# Patient Record
Sex: FEMALE | Race: BLACK OR AFRICAN AMERICAN | Employment: OTHER | URBAN - METROPOLITAN AREA
[De-identification: names, ages, dates, MRNs, and addresses within clinical notes are randomized per-mention and may not be internally consistent; named-entity substitution may affect disease eponyms.]

---

## 2017-01-11 ENCOUNTER — OFFICE VISIT (OUTPATIENT)
Dept: CARDIOLOGY CLINIC | Age: 77
End: 2017-01-11

## 2017-01-11 VITALS
RESPIRATION RATE: 18 BRPM | HEIGHT: 67 IN | SYSTOLIC BLOOD PRESSURE: 116 MMHG | HEART RATE: 72 BPM | DIASTOLIC BLOOD PRESSURE: 70 MMHG | BODY MASS INDEX: 25.71 KG/M2 | WEIGHT: 163.8 LBS

## 2017-01-11 DIAGNOSIS — I10 ESSENTIAL HYPERTENSION, BENIGN: Primary | ICD-10-CM

## 2017-01-11 DIAGNOSIS — I25.2 OLD MYOCARDIAL INFARCTION: ICD-10-CM

## 2017-01-11 DIAGNOSIS — I25.10 ATHEROSCLEROSIS OF NATIVE CORONARY ARTERY OF NATIVE HEART WITHOUT ANGINA PECTORIS: ICD-10-CM

## 2017-01-11 PROCEDURE — 99213 OFFICE O/P EST LOW 20 MIN: CPT | Performed by: INTERNAL MEDICINE

## 2017-02-28 PROBLEM — I10 HTN (HYPERTENSION): Status: ACTIVE | Noted: 2017-02-28

## 2017-02-28 PROBLEM — A41.9 SEPSIS (HCC): Status: ACTIVE | Noted: 2017-02-28

## 2017-02-28 PROBLEM — K21.9 GERD (GASTROESOPHAGEAL REFLUX DISEASE): Status: ACTIVE | Noted: 2017-02-28

## 2017-02-28 PROBLEM — I50.21 ACUTE SYSTOLIC (CONGESTIVE) HEART FAILURE (HCC): Status: ACTIVE | Noted: 2017-02-28

## 2017-02-28 PROBLEM — J18.9 CAP (COMMUNITY ACQUIRED PNEUMONIA): Status: ACTIVE | Noted: 2017-02-28

## 2017-03-13 ENCOUNTER — TELEPHONE (OUTPATIENT)
Dept: OTHER | Age: 77
End: 2017-03-13

## 2017-04-03 LAB
ALBUMIN SERPL-MCNC: 3.1 G/DL
ALP BLD-CCNC: 37 U/L
ALT SERPL-CCNC: 13 U/L
AST SERPL-CCNC: 23 U/L
BASOPHILS ABSOLUTE: 0 /ΜL
BASOPHILS RELATIVE PERCENT: 1 %
BILIRUB SERPL-MCNC: 0.3 MG/DL (ref 0.1–1.4)
BUN BLDV-MCNC: 15 MG/DL
C-REACTIVE PROTEIN: 23
CALCIUM SERPL-MCNC: 8.9 MG/DL
CHLORIDE BLD-SCNC: 101 MMOL/L
CO2: 28 MMOL/L
CREAT SERPL-MCNC: 0.68 MG/DL
EOSINOPHILS ABSOLUTE: 0.4 /ΜL
EOSINOPHILS RELATIVE PERCENT: 6 %
GFR CALCULATED: >60
GLUCOSE BLD-MCNC: 94 MG/DL
HCT VFR BLD CALC: 28 % (ref 36–46)
HEMOGLOBIN: 9.4 G/DL (ref 12–16)
LYMPHOCYTES ABSOLUTE: 1.5 /ΜL
LYMPHOCYTES RELATIVE PERCENT: 22 %
MCH RBC QN AUTO: 32 PG
MCHC RBC AUTO-ENTMCNC: 34 G/DL
MCV RBC AUTO: 96 FL
MONOCYTES ABSOLUTE: 1 /ΜL
MONOCYTES RELATIVE PERCENT: 15 %
NEUTROPHILS ABSOLUTE: 3.8 /ΜL
NEUTROPHILS RELATIVE PERCENT: 56 %
PDW BLD-RTO: 13.7 %
PLATELET # BLD: 257 K/ΜL
PMV BLD AUTO: ABNORMAL FL
POTASSIUM SERPL-SCNC: 3.2 MMOL/L
RBC # BLD: 2.9 10^6/ΜL
SEDIMENTATION RATE, ERYTHROCYTE: 55
SODIUM BLD-SCNC: 139 MMOL/L
TOTAL PROTEIN: 8.1
WBC # BLD: 6.7 10^3/ML

## 2017-04-06 ENCOUNTER — TELEPHONE (OUTPATIENT)
Dept: INFECTIOUS DISEASES | Age: 77
End: 2017-04-06

## 2017-04-07 ENCOUNTER — OFFICE VISIT (OUTPATIENT)
Dept: CARDIOLOGY CLINIC | Age: 77
End: 2017-04-07

## 2017-04-07 VITALS
WEIGHT: 162.4 LBS | DIASTOLIC BLOOD PRESSURE: 64 MMHG | HEART RATE: 112 BPM | BODY MASS INDEX: 25.44 KG/M2 | SYSTOLIC BLOOD PRESSURE: 136 MMHG

## 2017-04-07 DIAGNOSIS — I25.10 CORONARY ARTERY DISEASE INVOLVING NATIVE HEART WITHOUT ANGINA PECTORIS, UNSPECIFIED VESSEL OR LESION TYPE: ICD-10-CM

## 2017-04-07 DIAGNOSIS — I10 ESSENTIAL HYPERTENSION: ICD-10-CM

## 2017-04-07 DIAGNOSIS — Z90.81 HISTORY OF SPLENECTOMY: ICD-10-CM

## 2017-04-07 DIAGNOSIS — B95.3 BACTEREMIA DUE TO STREPTOCOCCUS PNEUMONIAE: Primary | ICD-10-CM

## 2017-04-07 DIAGNOSIS — E78.2 MIXED HYPERLIPIDEMIA: ICD-10-CM

## 2017-04-07 DIAGNOSIS — R78.81 BACTEREMIA DUE TO STREPTOCOCCUS PNEUMONIAE: Primary | ICD-10-CM

## 2017-04-07 PROCEDURE — 99214 OFFICE O/P EST MOD 30 MIN: CPT | Performed by: INTERNAL MEDICINE

## 2017-04-18 ENCOUNTER — OFFICE VISIT (OUTPATIENT)
Dept: INFECTIOUS DISEASES | Age: 77
End: 2017-04-18

## 2017-04-18 VITALS
HEIGHT: 67 IN | DIASTOLIC BLOOD PRESSURE: 73 MMHG | HEART RATE: 81 BPM | SYSTOLIC BLOOD PRESSURE: 116 MMHG | TEMPERATURE: 97 F | WEIGHT: 160 LBS | BODY MASS INDEX: 25.11 KG/M2

## 2017-04-18 DIAGNOSIS — R78.81 BACTEREMIA DUE TO STREPTOCOCCUS PNEUMONIAE: Primary | ICD-10-CM

## 2017-04-18 DIAGNOSIS — M46.46 LUMBAR DISCITIS: ICD-10-CM

## 2017-04-18 DIAGNOSIS — B95.3 BACTEREMIA DUE TO STREPTOCOCCUS PNEUMONIAE: Primary | ICD-10-CM

## 2017-04-18 DIAGNOSIS — D84.9 IMMUNOCOMPROMISED (HCC): ICD-10-CM

## 2017-04-18 PROCEDURE — 99215 OFFICE O/P EST HI 40 MIN: CPT | Performed by: INTERNAL MEDICINE

## 2017-04-18 RX ORDER — CEFADROXIL 500 MG/1
500 CAPSULE ORAL 2 TIMES DAILY
Qty: 60 CAPSULE | Refills: 0 | Status: SHIPPED | OUTPATIENT
Start: 2017-04-18 | End: 2017-05-23 | Stop reason: SDUPTHER

## 2017-04-18 RX ORDER — APIXABAN 5 MG/1
5 TABLET, FILM COATED ORAL 2 TIMES DAILY
COMMUNITY
Start: 2017-04-07 | End: 2017-08-22

## 2017-04-21 RX ORDER — SPIRONOLACTONE 25 MG/1
25 TABLET ORAL DAILY
Qty: 90 TABLET | Refills: 3 | Status: SHIPPED | OUTPATIENT
Start: 2017-04-21 | End: 2018-03-05 | Stop reason: SDUPTHER

## 2017-04-21 RX ORDER — CARVEDILOL 3.12 MG/1
3.12 TABLET ORAL 2 TIMES DAILY WITH MEALS
Qty: 180 TABLET | Refills: 3 | Status: SHIPPED | OUTPATIENT
Start: 2017-04-21 | End: 2018-03-05 | Stop reason: SDUPTHER

## 2017-04-21 RX ORDER — LISINOPRIL 2.5 MG/1
2.5 TABLET ORAL DAILY
Qty: 90 TABLET | Refills: 3 | Status: SHIPPED | OUTPATIENT
Start: 2017-04-21 | End: 2018-03-05 | Stop reason: SDUPTHER

## 2017-04-21 RX ORDER — DIGOXIN 125 MCG
125 TABLET ORAL DAILY
Qty: 90 TABLET | Refills: 3 | Status: SHIPPED | OUTPATIENT
Start: 2017-04-21 | End: 2018-03-05 | Stop reason: SDUPTHER

## 2017-05-23 ENCOUNTER — HOSPITAL ENCOUNTER (OUTPATIENT)
Dept: OTHER | Age: 77
Discharge: HOME | End: 2017-05-23
Attending: INTERNAL MEDICINE | Admitting: INTERNAL MEDICINE

## 2017-05-23 ENCOUNTER — OFFICE VISIT (OUTPATIENT)
Dept: INFECTIOUS DISEASES | Age: 77
End: 2017-05-23

## 2017-05-23 VITALS
TEMPERATURE: 97.7 F | WEIGHT: 157 LBS | HEART RATE: 81 BPM | DIASTOLIC BLOOD PRESSURE: 67 MMHG | SYSTOLIC BLOOD PRESSURE: 108 MMHG | HEIGHT: 67 IN | BODY MASS INDEX: 24.64 KG/M2

## 2017-05-23 DIAGNOSIS — M46.46 LUMBAR DISCITIS: ICD-10-CM

## 2017-05-23 DIAGNOSIS — R78.81 BACTEREMIA DUE TO STREPTOCOCCUS PNEUMONIAE: Primary | ICD-10-CM

## 2017-05-23 DIAGNOSIS — B95.3 BACTEREMIA DUE TO STREPTOCOCCUS PNEUMONIAE: Primary | ICD-10-CM

## 2017-05-23 LAB
A/G RATIO: 0.8 (ref 1.1–2.2)
ALBUMIN SERPL-MCNC: 3.5 G/DL (ref 3.4–5)
ALP BLD-CCNC: 42 U/L (ref 40–129)
ALT SERPL-CCNC: 11 U/L (ref 10–40)
ANION GAP SERPL CALCULATED.3IONS-SCNC: 11 MMOL/L (ref 3–16)
AST SERPL-CCNC: 21 U/L (ref 15–37)
BASOPHILS ABSOLUTE: 0.1 K/UL (ref 0–0.2)
BASOPHILS RELATIVE PERCENT: 1 %
BILIRUB SERPL-MCNC: 0.4 MG/DL (ref 0–1)
BUN BLDV-MCNC: 12 MG/DL (ref 7–20)
C-REACTIVE PROTEIN: 3.2 MG/L (ref 0–5.1)
CALCIUM SERPL-MCNC: 9.5 MG/DL (ref 8.3–10.6)
CHLORIDE BLD-SCNC: 105 MMOL/L (ref 99–110)
CO2: 28 MMOL/L (ref 21–32)
CREAT SERPL-MCNC: 0.6 MG/DL (ref 0.6–1.2)
EOSINOPHILS ABSOLUTE: 0.4 K/UL (ref 0–0.6)
EOSINOPHILS RELATIVE PERCENT: 7.8 %
GFR AFRICAN AMERICAN: >60
GFR NON-AFRICAN AMERICAN: >60
GLOBULIN: 4.3 G/DL
GLUCOSE BLD-MCNC: 101 MG/DL (ref 70–99)
HCT VFR BLD CALC: 38.5 % (ref 36–48)
HEMOGLOBIN: 12.3 G/DL (ref 12–16)
LYMPHOCYTES ABSOLUTE: 1.2 K/UL (ref 1–5.1)
LYMPHOCYTES RELATIVE PERCENT: 22.8 %
MCH RBC QN AUTO: 31.2 PG (ref 26–34)
MCHC RBC AUTO-ENTMCNC: 32 G/DL (ref 31–36)
MCV RBC AUTO: 97.5 FL (ref 80–100)
MONOCYTES ABSOLUTE: 0.7 K/UL (ref 0–1.3)
MONOCYTES RELATIVE PERCENT: 12.5 %
NEUTROPHILS ABSOLUTE: 3 K/UL (ref 1.7–7.7)
NEUTROPHILS RELATIVE PERCENT: 55.9 %
PDW BLD-RTO: 14.1 % (ref 12.4–15.4)
PLATELET # BLD: 230 K/UL (ref 135–450)
PMV BLD AUTO: 8.9 FL (ref 5–10.5)
POTASSIUM SERPL-SCNC: 4.6 MMOL/L (ref 3.5–5.1)
RBC # BLD: 3.95 M/UL (ref 4–5.2)
SEDIMENTATION RATE, ERYTHROCYTE: 25 MM/HR (ref 0–30)
SODIUM BLD-SCNC: 144 MMOL/L (ref 136–145)
TOTAL PROTEIN: 7.8 G/DL (ref 6.4–8.2)
WBC # BLD: 5.4 K/UL (ref 4–11)

## 2017-05-23 PROCEDURE — 99214 OFFICE O/P EST MOD 30 MIN: CPT | Performed by: INTERNAL MEDICINE

## 2017-05-23 RX ORDER — CEFADROXIL 500 MG/1
500 CAPSULE ORAL 2 TIMES DAILY
Qty: 60 CAPSULE | Refills: 1 | Status: SHIPPED | OUTPATIENT
Start: 2017-05-23 | End: 2017-06-07

## 2017-05-24 ENCOUNTER — TELEPHONE (OUTPATIENT)
Dept: INFECTIOUS DISEASES | Age: 77
End: 2017-05-24

## 2017-06-07 ENCOUNTER — OFFICE VISIT (OUTPATIENT)
Dept: CARDIOLOGY CLINIC | Age: 77
End: 2017-06-07

## 2017-06-07 VITALS
DIASTOLIC BLOOD PRESSURE: 60 MMHG | HEART RATE: 68 BPM | SYSTOLIC BLOOD PRESSURE: 100 MMHG | BODY MASS INDEX: 24.01 KG/M2 | WEIGHT: 153 LBS

## 2017-06-07 DIAGNOSIS — I10 ESSENTIAL HYPERTENSION: Primary | ICD-10-CM

## 2017-06-07 PROCEDURE — 93000 ELECTROCARDIOGRAM COMPLETE: CPT | Performed by: INTERNAL MEDICINE

## 2017-06-07 PROCEDURE — 99214 OFFICE O/P EST MOD 30 MIN: CPT | Performed by: INTERNAL MEDICINE

## 2017-06-15 ENCOUNTER — HOSPITAL ENCOUNTER (OUTPATIENT)
Dept: NON INVASIVE DIAGNOSTICS | Age: 77
Discharge: HOME | End: 2017-06-15
Attending: INTERNAL MEDICINE | Admitting: INTERNAL MEDICINE

## 2017-06-15 DIAGNOSIS — Z86.19 PERSONAL HISTORY OF OTHER INFECTIOUS AND PARASITIC DISEASES: ICD-10-CM

## 2017-06-15 LAB
LV EF: 48 %
LVEF MODALITY: NORMAL

## 2017-06-26 ENCOUNTER — HOSPITAL ENCOUNTER (OUTPATIENT)
Dept: MAMMOGRAPHY | Age: 77
Discharge: HOME | End: 2017-06-26
Attending: INTERNAL MEDICINE | Admitting: INTERNAL MEDICINE

## 2017-06-26 DIAGNOSIS — Z12.31 VISIT FOR SCREENING MAMMOGRAM: ICD-10-CM

## 2017-08-21 RX ORDER — FAMOTIDINE 20 MG/1
TABLET, FILM COATED ORAL
Qty: 180 TABLET | Refills: 2 | Status: SHIPPED | OUTPATIENT
Start: 2017-08-21 | End: 2018-05-20 | Stop reason: SDUPTHER

## 2017-09-06 ENCOUNTER — OFFICE VISIT (OUTPATIENT)
Dept: CARDIOLOGY CLINIC | Age: 77
End: 2017-09-06

## 2017-09-06 VITALS
WEIGHT: 152 LBS | SYSTOLIC BLOOD PRESSURE: 104 MMHG | BODY MASS INDEX: 23.86 KG/M2 | HEART RATE: 76 BPM | DIASTOLIC BLOOD PRESSURE: 50 MMHG

## 2017-09-06 DIAGNOSIS — E78.2 MIXED HYPERLIPIDEMIA: ICD-10-CM

## 2017-09-06 DIAGNOSIS — I50.21 ACUTE SYSTOLIC (CONGESTIVE) HEART FAILURE (HCC): ICD-10-CM

## 2017-09-06 DIAGNOSIS — I10 ESSENTIAL HYPERTENSION: Primary | ICD-10-CM

## 2017-09-06 DIAGNOSIS — I25.2 OLD MYOCARDIAL INFARCTION: ICD-10-CM

## 2017-09-06 PROCEDURE — 99214 OFFICE O/P EST MOD 30 MIN: CPT | Performed by: INTERNAL MEDICINE

## 2017-11-01 ENCOUNTER — HOSPITAL ENCOUNTER (OUTPATIENT)
Dept: OTHER | Age: 77
Discharge: HOME | End: 2017-11-30
Attending: INTERNAL MEDICINE | Admitting: INTERNAL MEDICINE

## 2017-11-08 RX ORDER — SIMVASTATIN 40 MG
TABLET ORAL
Qty: 90 TABLET | Refills: 3 | Status: SHIPPED | OUTPATIENT
Start: 2017-11-08 | End: 2018-11-26 | Stop reason: SDUPTHER

## 2017-12-01 ENCOUNTER — HOSPITAL ENCOUNTER (OUTPATIENT)
Dept: OTHER | Age: 77
Discharge: HOME | End: 2017-12-31
Attending: INTERNAL MEDICINE | Admitting: INTERNAL MEDICINE

## 2018-01-01 ENCOUNTER — HOSPITAL ENCOUNTER (OUTPATIENT)
Dept: OTHER | Age: 78
Discharge: HOME | End: 2018-01-31
Attending: INTERNAL MEDICINE | Admitting: INTERNAL MEDICINE

## 2018-01-30 ENCOUNTER — TELEPHONE (OUTPATIENT)
Dept: CARDIOLOGY CLINIC | Age: 78
End: 2018-01-30

## 2018-02-01 ENCOUNTER — HOSPITAL ENCOUNTER (OUTPATIENT)
Dept: OTHER | Age: 78
Discharge: HOME | End: 2018-02-28
Attending: INTERNAL MEDICINE | Admitting: INTERNAL MEDICINE

## 2018-03-01 ENCOUNTER — HOSPITAL ENCOUNTER (OUTPATIENT)
Dept: OTHER | Age: 78
Discharge: HOME | End: 2018-03-31
Attending: INTERNAL MEDICINE | Admitting: INTERNAL MEDICINE

## 2018-03-06 RX ORDER — DIGOXIN 125 MCG
TABLET ORAL
Qty: 90 TABLET | Refills: 3 | Status: SHIPPED | OUTPATIENT
Start: 2018-03-06 | End: 2019-01-10 | Stop reason: SDUPTHER

## 2018-03-06 RX ORDER — CARVEDILOL 3.12 MG/1
TABLET ORAL
Qty: 180 TABLET | Refills: 3 | Status: SHIPPED | OUTPATIENT
Start: 2018-03-06 | End: 2019-01-10 | Stop reason: SDUPTHER

## 2018-03-06 RX ORDER — SPIRONOLACTONE 25 MG/1
TABLET ORAL
Qty: 90 TABLET | Refills: 3 | Status: SHIPPED | OUTPATIENT
Start: 2018-03-06 | End: 2019-01-10 | Stop reason: SDUPTHER

## 2018-03-06 RX ORDER — LISINOPRIL 2.5 MG/1
TABLET ORAL
Qty: 90 TABLET | Refills: 3 | Status: SHIPPED | OUTPATIENT
Start: 2018-03-06 | End: 2019-01-10 | Stop reason: SDUPTHER

## 2018-04-01 ENCOUNTER — HOSPITAL ENCOUNTER (OUTPATIENT)
Dept: OTHER | Age: 78
Discharge: HOME | End: 2018-04-30
Attending: INTERNAL MEDICINE | Admitting: INTERNAL MEDICINE

## 2018-04-06 ENCOUNTER — OFFICE VISIT (OUTPATIENT)
Dept: CARDIOLOGY CLINIC | Age: 78
End: 2018-04-06

## 2018-04-06 VITALS
SYSTOLIC BLOOD PRESSURE: 120 MMHG | HEART RATE: 64 BPM | WEIGHT: 158.8 LBS | BODY MASS INDEX: 24.92 KG/M2 | DIASTOLIC BLOOD PRESSURE: 60 MMHG

## 2018-04-06 DIAGNOSIS — D69.3 IMMUNE THROMBOCYTOPENIC PURPURA (HCC): ICD-10-CM

## 2018-04-06 DIAGNOSIS — I25.10 ATHEROSCLEROSIS OF NATIVE CORONARY ARTERY OF NATIVE HEART WITHOUT ANGINA PECTORIS: Primary | ICD-10-CM

## 2018-04-06 DIAGNOSIS — I10 ESSENTIAL HYPERTENSION, BENIGN: ICD-10-CM

## 2018-04-06 PROCEDURE — G8598 ASA/ANTIPLAT THER USED: HCPCS | Performed by: INTERNAL MEDICINE

## 2018-04-06 PROCEDURE — G8399 PT W/DXA RESULTS DOCUMENT: HCPCS | Performed by: INTERNAL MEDICINE

## 2018-04-06 PROCEDURE — 1123F ACP DISCUSS/DSCN MKR DOCD: CPT | Performed by: INTERNAL MEDICINE

## 2018-04-06 PROCEDURE — G8427 DOCREV CUR MEDS BY ELIG CLIN: HCPCS | Performed by: INTERNAL MEDICINE

## 2018-04-06 PROCEDURE — 1036F TOBACCO NON-USER: CPT | Performed by: INTERNAL MEDICINE

## 2018-04-06 PROCEDURE — 1090F PRES/ABSN URINE INCON ASSESS: CPT | Performed by: INTERNAL MEDICINE

## 2018-04-06 PROCEDURE — 99213 OFFICE O/P EST LOW 20 MIN: CPT | Performed by: INTERNAL MEDICINE

## 2018-04-06 PROCEDURE — 4040F PNEUMOC VAC/ADMIN/RCVD: CPT | Performed by: INTERNAL MEDICINE

## 2018-04-06 PROCEDURE — G8420 CALC BMI NORM PARAMETERS: HCPCS | Performed by: INTERNAL MEDICINE

## 2018-04-06 RX ORDER — OMEPRAZOLE 20 MG/1
20 CAPSULE, DELAYED RELEASE ORAL DAILY
COMMUNITY
End: 2018-05-21 | Stop reason: SDUPTHER

## 2018-04-06 RX ORDER — DULOXETIN HYDROCHLORIDE 20 MG/1
20 CAPSULE, DELAYED RELEASE ORAL DAILY
COMMUNITY

## 2018-04-06 RX ORDER — GABAPENTIN 300 MG/1
300 CAPSULE ORAL 2 TIMES DAILY
COMMUNITY

## 2018-05-01 ENCOUNTER — HOSPITAL ENCOUNTER (OUTPATIENT)
Dept: OTHER | Age: 78
Discharge: HOME | End: 2018-05-31
Attending: INTERNAL MEDICINE | Admitting: INTERNAL MEDICINE

## 2018-05-21 RX ORDER — FAMOTIDINE 20 MG/1
TABLET, FILM COATED ORAL
Qty: 180 TABLET | Refills: 3 | Status: SHIPPED | OUTPATIENT
Start: 2018-05-21 | End: 2019-03-02 | Stop reason: SDUPTHER

## 2018-06-01 ENCOUNTER — HOSPITAL ENCOUNTER (OUTPATIENT)
Dept: OTHER | Age: 78
Discharge: HOME | End: 2018-06-30
Attending: INTERNAL MEDICINE | Admitting: INTERNAL MEDICINE

## 2018-07-01 ENCOUNTER — HOSPITAL ENCOUNTER (OUTPATIENT)
Dept: OTHER | Age: 78
Discharge: HOME | End: 2018-07-01
Attending: INTERNAL MEDICINE | Admitting: INTERNAL MEDICINE

## 2018-07-03 ENCOUNTER — HOSPITAL ENCOUNTER (OUTPATIENT)
Dept: MAMMOGRAPHY | Age: 78
Discharge: HOME | End: 2018-07-03
Attending: INTERNAL MEDICINE | Admitting: INTERNAL MEDICINE

## 2018-07-03 DIAGNOSIS — Z12.31 VISIT FOR SCREENING MAMMOGRAM: ICD-10-CM

## 2018-07-06 ENCOUNTER — HOSPITAL ENCOUNTER (OUTPATIENT)
Dept: MAMMOGRAPHY | Age: 78
Discharge: HOME | End: 2018-07-06
Attending: INTERNAL MEDICINE | Admitting: INTERNAL MEDICINE

## 2018-07-06 DIAGNOSIS — R92.8 ABNORMAL MAMMOGRAM: ICD-10-CM

## 2018-10-10 ENCOUNTER — OFFICE VISIT (OUTPATIENT)
Dept: CARDIOLOGY CLINIC | Age: 78
End: 2018-10-10
Payer: MEDICARE

## 2018-10-10 VITALS
WEIGHT: 153.8 LBS | SYSTOLIC BLOOD PRESSURE: 110 MMHG | HEART RATE: 59 BPM | DIASTOLIC BLOOD PRESSURE: 64 MMHG | BODY MASS INDEX: 24.14 KG/M2

## 2018-10-10 DIAGNOSIS — I10 ESSENTIAL HYPERTENSION: Primary | ICD-10-CM

## 2018-10-10 DIAGNOSIS — I25.10 CORONARY ARTERY DISEASE INVOLVING NATIVE HEART WITHOUT ANGINA PECTORIS, UNSPECIFIED VESSEL OR LESION TYPE: ICD-10-CM

## 2018-10-10 PROCEDURE — 1090F PRES/ABSN URINE INCON ASSESS: CPT | Performed by: INTERNAL MEDICINE

## 2018-10-10 PROCEDURE — 1101F PT FALLS ASSESS-DOCD LE1/YR: CPT | Performed by: INTERNAL MEDICINE

## 2018-10-10 PROCEDURE — G8598 ASA/ANTIPLAT THER USED: HCPCS | Performed by: INTERNAL MEDICINE

## 2018-10-10 PROCEDURE — G8399 PT W/DXA RESULTS DOCUMENT: HCPCS | Performed by: INTERNAL MEDICINE

## 2018-10-10 PROCEDURE — 4040F PNEUMOC VAC/ADMIN/RCVD: CPT | Performed by: INTERNAL MEDICINE

## 2018-10-10 PROCEDURE — G8484 FLU IMMUNIZE NO ADMIN: HCPCS | Performed by: INTERNAL MEDICINE

## 2018-10-10 PROCEDURE — 99214 OFFICE O/P EST MOD 30 MIN: CPT | Performed by: INTERNAL MEDICINE

## 2018-10-10 PROCEDURE — G8420 CALC BMI NORM PARAMETERS: HCPCS | Performed by: INTERNAL MEDICINE

## 2018-10-10 PROCEDURE — 1123F ACP DISCUSS/DSCN MKR DOCD: CPT | Performed by: INTERNAL MEDICINE

## 2018-10-10 PROCEDURE — 1036F TOBACCO NON-USER: CPT | Performed by: INTERNAL MEDICINE

## 2018-10-10 PROCEDURE — 93000 ELECTROCARDIOGRAM COMPLETE: CPT | Performed by: INTERNAL MEDICINE

## 2018-10-10 PROCEDURE — G8427 DOCREV CUR MEDS BY ELIG CLIN: HCPCS | Performed by: INTERNAL MEDICINE

## 2018-11-27 RX ORDER — SIMVASTATIN 40 MG
TABLET ORAL
Qty: 90 TABLET | Refills: 3 | Status: SHIPPED | OUTPATIENT
Start: 2018-11-27 | End: 2019-09-21 | Stop reason: SDUPTHER

## 2019-01-10 RX ORDER — CARVEDILOL 3.12 MG/1
TABLET ORAL
Qty: 180 TABLET | Refills: 3 | Status: SHIPPED | OUTPATIENT
Start: 2019-01-10 | End: 2019-12-04 | Stop reason: SDUPTHER

## 2019-01-10 RX ORDER — SPIRONOLACTONE 25 MG/1
TABLET ORAL
Qty: 90 TABLET | Refills: 3 | Status: SHIPPED | OUTPATIENT
Start: 2019-01-10 | End: 2019-12-04 | Stop reason: SDUPTHER

## 2019-01-10 RX ORDER — DIGOXIN 125 MCG
TABLET ORAL
Qty: 90 TABLET | Refills: 3 | Status: SHIPPED | OUTPATIENT
Start: 2019-01-10 | End: 2020-01-20

## 2019-01-10 RX ORDER — LISINOPRIL 2.5 MG/1
TABLET ORAL
Qty: 90 TABLET | Refills: 3 | Status: SHIPPED | OUTPATIENT
Start: 2019-01-10 | End: 2019-12-04 | Stop reason: SDUPTHER

## 2019-03-07 RX ORDER — FAMOTIDINE 20 MG/1
TABLET, FILM COATED ORAL
Qty: 180 TABLET | Refills: 2 | Status: SHIPPED | OUTPATIENT
Start: 2019-03-07 | End: 2019-12-02 | Stop reason: SDUPTHER

## 2019-03-15 ENCOUNTER — TELEPHONE (OUTPATIENT)
Dept: CARDIOLOGY CLINIC | Age: 79
End: 2019-03-15

## 2019-04-17 ENCOUNTER — OFFICE VISIT (OUTPATIENT)
Dept: CARDIOLOGY CLINIC | Age: 79
End: 2019-04-17
Payer: MEDICARE

## 2019-04-17 VITALS
HEART RATE: 62 BPM | BODY MASS INDEX: 25.14 KG/M2 | DIASTOLIC BLOOD PRESSURE: 70 MMHG | SYSTOLIC BLOOD PRESSURE: 102 MMHG | WEIGHT: 160.2 LBS

## 2019-04-17 DIAGNOSIS — I25.10 CORONARY ARTERY DISEASE INVOLVING NATIVE CORONARY ARTERY OF NATIVE HEART WITHOUT ANGINA PECTORIS: ICD-10-CM

## 2019-04-17 DIAGNOSIS — I10 ESSENTIAL HYPERTENSION, BENIGN: ICD-10-CM

## 2019-04-17 DIAGNOSIS — E78.2 MIXED HYPERLIPIDEMIA: Primary | ICD-10-CM

## 2019-04-17 PROCEDURE — 99214 OFFICE O/P EST MOD 30 MIN: CPT | Performed by: INTERNAL MEDICINE

## 2019-04-17 PROCEDURE — G8399 PT W/DXA RESULTS DOCUMENT: HCPCS | Performed by: INTERNAL MEDICINE

## 2019-04-17 PROCEDURE — 1123F ACP DISCUSS/DSCN MKR DOCD: CPT | Performed by: INTERNAL MEDICINE

## 2019-04-17 PROCEDURE — 1090F PRES/ABSN URINE INCON ASSESS: CPT | Performed by: INTERNAL MEDICINE

## 2019-04-17 PROCEDURE — G8598 ASA/ANTIPLAT THER USED: HCPCS | Performed by: INTERNAL MEDICINE

## 2019-04-17 PROCEDURE — G8419 CALC BMI OUT NRM PARAM NOF/U: HCPCS | Performed by: INTERNAL MEDICINE

## 2019-04-17 PROCEDURE — 4040F PNEUMOC VAC/ADMIN/RCVD: CPT | Performed by: INTERNAL MEDICINE

## 2019-04-17 PROCEDURE — G8427 DOCREV CUR MEDS BY ELIG CLIN: HCPCS | Performed by: INTERNAL MEDICINE

## 2019-04-17 PROCEDURE — 1036F TOBACCO NON-USER: CPT | Performed by: INTERNAL MEDICINE

## 2019-04-17 NOTE — PROGRESS NOTES
Subjective:      Patient ID: Jessica Ngo is a 77 y.o. female. CC:  2 month followup Follow up CAD with stents remotely and recent PNA and sepsis. HPI:  Patien has pna and sepsis. Lactate was 7.4 in beginning in March 2017 and was in hospital in [place] and then [facility]. Off eliquis for LLE DVT  Finished IV ATB for spine infection and finished po ATB. She has sinus pressure. No chest pain at all. Echo LVEF 25% and MPI 48% with no ischemia but apical infarct from 2000 MI and stent in mid LAD then. No chest pain. LVEF improved on recent 6/17 echo and LVEF 45-50%. No chest pressure nor orthopnea. Left knee swollen but no knee pain nor back pain. Goes to [facility] for water aerobics 2 times a week. No Known Allergies     Social History     Socioeconomic History    Marital status:      Spouse name: Not on file    Number of children: Not on file    Years of education: Not on file    Highest education level: Not on file   Occupational History    Not on file   Social Needs    Financial resource strain: Not on file    Food insecurity:     Worry: Not on file     Inability: Not on file    Transportation needs:     Medical: Not on file     Non-medical: Not on file   Tobacco Use    Smoking status: Never Smoker    Smokeless tobacco: Never Used   Substance and Sexual Activity    Alcohol use: No    Drug use: No    Sexual activity: Not on file     Comment: .    Lifestyle    Physical activity:     Days per week: Not on file     Minutes per session: Not on file    Stress: Not on file   Relationships    Social connections:     Talks on phone: Not on file     Gets together: Not on file     Attends Jehov[employer]'s witness service: Not on file     Active member of club or organization: Not on file     Attends meetings of clubs or organizations: Not on file     Relationship status: Not on file    Intimate partner violence:     Fear of current or ex partner: Not on file     Emotionally abused: Not on file     Physically abused: Not on file     Forced sexual activity: Not on file   Other Topics Concern    Not on file   Social History Narrative    Not on file        Patient has a family history includes Heart Disease in an other family member. Patient  has a past medical history of CAD (coronary artery disease), Cough, Hyperlipidemia, and Hypertension. Current Outpatient Medications   Medication Sig Dispense Refill    famotidine (PEPCID) 20 MG tablet TAKE 1 TABLET TWICE DAILY 180 tablet 2    lisinopril (PRINIVIL;ZESTRIL) 2.5 MG tablet TAKE 1 TABLET EVERY DAY 90 tablet 3    carvedilol (COREG) 3.125 MG tablet TAKE 1 TABLET TWICE DAILY WITH MEALS 180 tablet 3    spironolactone (ALDACTONE) 25 MG tablet TAKE 1 TABLET EVERY DAY 90 tablet 3    digoxin (LANOXIN) 125 MCG tablet TAKE 1 TABLET EVERY DAY 90 tablet 3    simvastatin (ZOCOR) 40 MG tablet TAKE 1 TABLET EVERY NIGHT 90 tablet 3    gabapentin (NEURONTIN) 300 MG capsule Take 300 mg by mouth 2 times daily.  DULoxetine (CYMBALTA) 20 MG extended release capsule Take 20 mg by mouth daily      lidocaine (LIDODERM) 5 % Place 1 patch onto the skin daily 12 hours on, 12 hours off. 30 patch 0    furosemide (LASIX) 20 MG tablet Take 1 tablet by mouth daily 60 tablet 0    aspirin 81 MG tablet Take 81 mg by mouth 2 times daily       nitroGLYCERIN (NITROSTAT) 0.4 MG SL tablet Place 1 tablet under the tongue every 5 minutes as needed 25 tablet 3    Handicap Placard MISC Duration 5 years  Dx: Coronoary Artery Disease, Old myocardial infarction 1 each 0     No current facility-administered medications for this visit. Vitals  Weight: 160 lb 3.2 oz (72.7 kg)164  Blood Pressure: BP: (102)/(70)  124/66  Pulse: 62 68      Review of Systems   Constitutional: Negative. HENT: Negative. Eyes: Negative. Respiratory: Negative. Cardiovascular: Negative. Gastrointestinal: Negative.         Objective:   Physical Exam   Nursing note and vitals

## 2019-06-24 ENCOUNTER — HOSPITAL ENCOUNTER (OUTPATIENT)
Dept: MAMMOGRAPHY | Age: 79
Discharge: HOME | End: 2019-06-24
Payer: MEDICARE

## 2019-06-24 DIAGNOSIS — Z12.31 VISIT FOR SCREENING MAMMOGRAM: ICD-10-CM

## 2019-06-24 PROCEDURE — 77067 SCR MAMMO BI INCL CAD: CPT

## 2019-07-03 ENCOUNTER — HOSPITAL ENCOUNTER (OUTPATIENT)
Dept: ULTRASOUND IMAGING | Age: 79
Discharge: HOME | End: 2019-07-03
Payer: MEDICARE

## 2019-07-03 ENCOUNTER — HOSPITAL ENCOUNTER (OUTPATIENT)
Dept: MAMMOGRAPHY | Age: 79
Discharge: HOME | End: 2019-07-03
Payer: MEDICARE

## 2019-07-03 DIAGNOSIS — N63.0 BREAST MASS: ICD-10-CM

## 2019-07-03 DIAGNOSIS — R92.8 ABNORMAL MAMMOGRAM: ICD-10-CM

## 2019-07-03 PROCEDURE — 88341 IMHCHEM/IMCYTCHM EA ADD ANTB: CPT

## 2019-07-03 PROCEDURE — 77065 DX MAMMO INCL CAD UNI: CPT

## 2019-07-03 PROCEDURE — 88360 TUMOR IMMUNOHISTOCHEM/MANUAL: CPT

## 2019-07-03 PROCEDURE — 2709999900 US BREAST BIOPSY W LOC DEVICE 1ST LESION RIGHT

## 2019-07-03 PROCEDURE — 76642 ULTRASOUND BREAST LIMITED: CPT

## 2019-07-03 PROCEDURE — 88342 IMHCHEM/IMCYTCHM 1ST ANTB: CPT

## 2019-07-03 PROCEDURE — 88305 TISSUE EXAM BY PATHOLOGIST: CPT

## 2019-07-03 PROCEDURE — 38505 NEEDLE BIOPSY LYMPH NODES: CPT

## 2019-07-26 ENCOUNTER — TELEPHONE (OUTPATIENT)
Dept: CARDIOLOGY CLINIC | Age: 79
End: 2019-07-26

## 2019-09-30 RX ORDER — SIMVASTATIN 40 MG
TABLET ORAL
Qty: 90 TABLET | Refills: 1 | Status: SHIPPED | OUTPATIENT
Start: 2019-09-30 | End: 2020-02-18

## 2019-10-14 ENCOUNTER — OFFICE VISIT (OUTPATIENT)
Dept: CARDIOLOGY CLINIC | Age: 79
End: 2019-10-14
Payer: MEDICARE

## 2019-10-14 VITALS
HEART RATE: 71 BPM | WEIGHT: 155.8 LBS | DIASTOLIC BLOOD PRESSURE: 70 MMHG | BODY MASS INDEX: 24.45 KG/M2 | SYSTOLIC BLOOD PRESSURE: 120 MMHG

## 2019-10-14 DIAGNOSIS — I10 ESSENTIAL HYPERTENSION, BENIGN: Primary | ICD-10-CM

## 2019-10-14 PROCEDURE — G8598 ASA/ANTIPLAT THER USED: HCPCS | Performed by: INTERNAL MEDICINE

## 2019-10-14 PROCEDURE — G8428 CUR MEDS NOT DOCUMENT: HCPCS | Performed by: INTERNAL MEDICINE

## 2019-10-14 PROCEDURE — 4040F PNEUMOC VAC/ADMIN/RCVD: CPT | Performed by: INTERNAL MEDICINE

## 2019-10-14 PROCEDURE — G8399 PT W/DXA RESULTS DOCUMENT: HCPCS | Performed by: INTERNAL MEDICINE

## 2019-10-14 PROCEDURE — 1123F ACP DISCUSS/DSCN MKR DOCD: CPT | Performed by: INTERNAL MEDICINE

## 2019-10-14 PROCEDURE — 93000 ELECTROCARDIOGRAM COMPLETE: CPT | Performed by: INTERNAL MEDICINE

## 2019-10-14 PROCEDURE — 1036F TOBACCO NON-USER: CPT | Performed by: INTERNAL MEDICINE

## 2019-10-14 PROCEDURE — G8484 FLU IMMUNIZE NO ADMIN: HCPCS | Performed by: INTERNAL MEDICINE

## 2019-10-14 PROCEDURE — G8420 CALC BMI NORM PARAMETERS: HCPCS | Performed by: INTERNAL MEDICINE

## 2019-10-14 PROCEDURE — 99214 OFFICE O/P EST MOD 30 MIN: CPT | Performed by: INTERNAL MEDICINE

## 2019-10-14 PROCEDURE — 1090F PRES/ABSN URINE INCON ASSESS: CPT | Performed by: INTERNAL MEDICINE

## 2019-12-03 RX ORDER — FAMOTIDINE 20 MG/1
TABLET, FILM COATED ORAL
Qty: 180 TABLET | Refills: 3 | Status: SHIPPED | OUTPATIENT
Start: 2019-12-03 | End: 2020-12-31

## 2019-12-04 RX ORDER — SPIRONOLACTONE 25 MG/1
TABLET ORAL
Qty: 90 TABLET | Refills: 0 | Status: SHIPPED | OUTPATIENT
Start: 2019-12-04 | End: 2020-05-04 | Stop reason: SDUPTHER

## 2019-12-04 RX ORDER — CARVEDILOL 3.12 MG/1
TABLET ORAL
Qty: 180 TABLET | Refills: 0 | Status: SHIPPED | OUTPATIENT
Start: 2019-12-04 | End: 2020-05-04 | Stop reason: SDUPTHER

## 2019-12-04 RX ORDER — LISINOPRIL 2.5 MG/1
TABLET ORAL
Qty: 90 TABLET | Refills: 0 | Status: SHIPPED | OUTPATIENT
Start: 2019-12-04 | End: 2020-05-04 | Stop reason: SDUPTHER

## 2020-01-20 ENCOUNTER — OFFICE VISIT (OUTPATIENT)
Dept: CARDIOLOGY CLINIC | Age: 80
End: 2020-01-20
Payer: MEDICARE

## 2020-01-20 VITALS
BODY MASS INDEX: 25.58 KG/M2 | SYSTOLIC BLOOD PRESSURE: 110 MMHG | DIASTOLIC BLOOD PRESSURE: 64 MMHG | WEIGHT: 163 LBS | HEART RATE: 72 BPM

## 2020-01-20 PROCEDURE — 1036F TOBACCO NON-USER: CPT | Performed by: INTERNAL MEDICINE

## 2020-01-20 PROCEDURE — G8399 PT W/DXA RESULTS DOCUMENT: HCPCS | Performed by: INTERNAL MEDICINE

## 2020-01-20 PROCEDURE — 4040F PNEUMOC VAC/ADMIN/RCVD: CPT | Performed by: INTERNAL MEDICINE

## 2020-01-20 PROCEDURE — G8427 DOCREV CUR MEDS BY ELIG CLIN: HCPCS | Performed by: INTERNAL MEDICINE

## 2020-01-20 PROCEDURE — 1090F PRES/ABSN URINE INCON ASSESS: CPT | Performed by: INTERNAL MEDICINE

## 2020-01-20 PROCEDURE — G8484 FLU IMMUNIZE NO ADMIN: HCPCS | Performed by: INTERNAL MEDICINE

## 2020-01-20 PROCEDURE — 1123F ACP DISCUSS/DSCN MKR DOCD: CPT | Performed by: INTERNAL MEDICINE

## 2020-01-20 PROCEDURE — 99213 OFFICE O/P EST LOW 20 MIN: CPT | Performed by: INTERNAL MEDICINE

## 2020-01-20 PROCEDURE — G8417 CALC BMI ABV UP PARAM F/U: HCPCS | Performed by: INTERNAL MEDICINE

## 2020-01-20 RX ORDER — FUROSEMIDE 20 MG/1
20 TABLET ORAL DAILY
Qty: 60 TABLET | Refills: 5 | Status: SHIPPED | OUTPATIENT
Start: 2020-01-20 | End: 2020-09-16 | Stop reason: SDUPTHER

## 2020-01-20 NOTE — PROGRESS NOTES
file     Physically abused: Not on file     Forced sexual activity: Not on file   Other Topics Concern    Not on file   Social History Narrative    Not on file        Patient has a family history includes Heart Disease in an other family member. Patient  has a past medical history of CAD (coronary artery disease), Cough, Hyperlipidemia, and Hypertension. Current Outpatient Medications   Medication Sig Dispense Refill    carvedilol (COREG) 3.125 MG tablet TAKE 1 TABLET TWICE DAILY  WITH  MEALS 180 tablet 0    spironolactone (ALDACTONE) 25 MG tablet TAKE 1 TABLET EVERY DAY 90 tablet 0    lisinopril (PRINIVIL;ZESTRIL) 2.5 MG tablet TAKE 1 TABLET EVERY DAY 90 tablet 0    famotidine (PEPCID) 20 MG tablet TAKE 1 TABLET TWICE DAILY 180 tablet 3    simvastatin (ZOCOR) 40 MG tablet TAKE 1 TABLET EVERY NIGHT 90 tablet 1    gabapentin (NEURONTIN) 300 MG capsule Take 300 mg by mouth 2 times daily.  DULoxetine (CYMBALTA) 20 MG extended release capsule Take 20 mg by mouth daily      lidocaine (LIDODERM) 5 % Place 1 patch onto the skin daily 12 hours on, 12 hours off. 30 patch 0    furosemide (LASIX) 20 MG tablet Take 1 tablet by mouth daily 60 tablet 0    aspirin 81 MG tablet Take 81 mg by mouth 2 times daily       nitroGLYCERIN (NITROSTAT) 0.4 MG SL tablet Place 1 tablet under the tongue every 5 minutes as needed 25 tablet 3    Handicap Placard MISC Duration 5 years  Dx: Coronoary Artery Disease, Old myocardial infarction 1 each 0     No current facility-administered medications for this visit. Vitals  Weight: 163 lb (73.9 kg)164  Blood Pressure: BP: (110)/(64)  124/66  Pulse: 72 68      Review of Systems   Constitutional: Negative. HENT: Negative. Eyes: Negative. Respiratory: Negative. Cardiovascular: Negative. Gastrointestinal: Negative. Objective:   Physical Exam   Nursing note and vitals reviewed. Constitutional: She is oriented to person, place, and time.  She appears well-developed and well-nourished. HENT:   Head: Normocephalic and atraumatic. Eyes: Conjunctivae are normal. Pupils are equal, round, and reactive to light. Neck: Normal range of motion. Neck supple. No JVD present. No tracheal deviation present. No thyromegaly present. Cardiovascular: Normal rate, regular rhythm, normal heart sounds and intact distal pulses. Exam reveals no gallop and no friction rub. No murmur heard. Pulmonary/Chest: Effort normal. No stridor. No respiratory distress. She has no wheezes. She has no rales. She exhibits no tenderness. Abdominal: Soft. Bowel sounds are normal. She exhibits no distension and no mass. No tenderness. She has no rebound and no guarding. Musculoskeletal: She exhibits no edema and no tenderness. Lymphadenopathy:     She has no cervical adenopathy. Neurological: She is alert and oriented to person, place, and time. No cranial nerve deficit. Coordination normal.   Skin: Skin is warm and dry. No rash noted. No erythema. No pallor. Psychiatric: She has a normal mood and affect. Her behavior is normal. Judgment and thought content normal.       Assessment:      Patient Active Problem List   Diagnosis    Sarcoidosis    Mixed hyperlipidemia    Immune thrombocytopenic purpura ([address]    Essential hypertension, benign    Old myocardial infarction    Coronary atherosclerosis of native coronary artery    Sprain of wrist    [facility]openia) [facility]    History of splenectomy    CAP (community acquired pneumonia)    Sepsis ([address]    HTN (hypertension)    GERD (gastroesophageal reflux disease)    Lactic acidosis    Streptococcal sepsis (HCC)    TIKI (acute kidney injury) ([address]    Acute systolic congestive heart failure (HCC)    Shock ([address]    Pneumonia due to Streptococcus pneumoniae ([address]    Bacteremia due to Streptococcus pneumoniae           Plan:      CAD:  Stable after remote stenting. Continue coreg and baby asa.  No SL NTG use. Mild ischemic CMP:  Stable. NO orthopnea nor left heart failure symptoms  HTN:  Controlled with lisinopril and coreg  HLP:  Controlled with simvastatin 40 daily  Edema: continue furosemide PRN. Recent echo improved to near normal with LVEF 35-40% on 9/19 echo  She had seven chemo runs for breast CAncer and had surgery, and completed radiation. Had left tkr March 12, 2018 at [facility] and doing well. No heart issues with surgery. Did fine from heart perspective with surgery.

## 2020-02-18 RX ORDER — SIMVASTATIN 40 MG
TABLET ORAL
Qty: 90 TABLET | Refills: 2 | Status: SHIPPED | OUTPATIENT
Start: 2020-02-18 | End: 2020-10-22

## 2020-05-05 RX ORDER — SPIRONOLACTONE 25 MG/1
TABLET ORAL
Qty: 90 TABLET | Refills: 3 | Status: SHIPPED | OUTPATIENT
Start: 2020-05-05 | End: 2021-02-03

## 2020-05-05 RX ORDER — LISINOPRIL 2.5 MG/1
TABLET ORAL
Qty: 90 TABLET | Refills: 3 | Status: SHIPPED | OUTPATIENT
Start: 2020-05-05 | End: 2021-02-03

## 2020-05-05 RX ORDER — CARVEDILOL 3.12 MG/1
TABLET ORAL
Qty: 180 TABLET | Refills: 3 | Status: SHIPPED | OUTPATIENT
Start: 2020-05-05 | End: 2021-02-03

## 2020-07-31 ENCOUNTER — OFFICE VISIT (OUTPATIENT)
Dept: CARDIOLOGY CLINIC | Age: 80
End: 2020-07-31
Payer: MEDICARE

## 2020-07-31 VITALS
HEART RATE: 76 BPM | SYSTOLIC BLOOD PRESSURE: 126 MMHG | DIASTOLIC BLOOD PRESSURE: 68 MMHG | TEMPERATURE: 97.3 F | WEIGHT: 157.8 LBS | BODY MASS INDEX: 24.77 KG/M2

## 2020-07-31 PROCEDURE — G8420 CALC BMI NORM PARAMETERS: HCPCS | Performed by: INTERNAL MEDICINE

## 2020-07-31 PROCEDURE — 4040F PNEUMOC VAC/ADMIN/RCVD: CPT | Performed by: INTERNAL MEDICINE

## 2020-07-31 PROCEDURE — G8399 PT W/DXA RESULTS DOCUMENT: HCPCS | Performed by: INTERNAL MEDICINE

## 2020-07-31 PROCEDURE — 1036F TOBACCO NON-USER: CPT | Performed by: INTERNAL MEDICINE

## 2020-07-31 PROCEDURE — 1090F PRES/ABSN URINE INCON ASSESS: CPT | Performed by: INTERNAL MEDICINE

## 2020-07-31 PROCEDURE — 99214 OFFICE O/P EST MOD 30 MIN: CPT | Performed by: INTERNAL MEDICINE

## 2020-07-31 PROCEDURE — 1123F ACP DISCUSS/DSCN MKR DOCD: CPT | Performed by: INTERNAL MEDICINE

## 2020-07-31 PROCEDURE — G8427 DOCREV CUR MEDS BY ELIG CLIN: HCPCS | Performed by: INTERNAL MEDICINE

## 2020-07-31 NOTE — PROGRESS NOTES
Subjective:      Patient ID: Saige Kramer is a 79 y.o. female. CC: cad      HPI:  Patien has pna and sepsis. Lactate was 7.4 in beginning in March 2017 and was in hospital in [place] and then [facility]. Off eliquis for LLE DVT  Finished IV ATB for spine infection and finished po ATB. She has sinus pressure. No chest pain at all. Echo LVEF 25% and MPI 48% with no ischemia but apical infarct from 2000 MI and stent in mid LAD then. No chest pain. LVEF improved on recent 6/17 echo and LVEF 45-50%. No chest pressure nor orthopnea. Left knee swollen but no knee pain nor back pain. Goes to [facility] for water aerobics 2 times a week. Wants a stair lift. No Known Allergies     Social History     Socioeconomic History    Marital status:      Spouse name: Not on file    Number of children: Not on file    Years of education: Not on file    Highest education level: Not on file   Occupational History    Not on file   Social Needs    Financial resource strain: Not on file    Food insecurity     Worry: Not on file     Inability: Not on file    Transportation needs     Medical: Not on file     Non-medical: Not on file   Tobacco Use    Smoking status: Never Smoker    Smokeless tobacco: Never Used   Substance and Sexual Activity    Alcohol use: No    Drug use: No    Sexual activity: Not on file     Comment: .    Lifestyle    Physical activity     Days per week: Not on file     Minutes per session: Not on file    Stress: Not on file   Relationships    Social connections     Talks on phone: Not on file     Gets together: Not on file     Attends Synagogue service: Not on file     Active member of club or organization: Not on file     Attends meetings of clubs or organizations: Not on file     Relationship status: Not on file    Intimate partner violence     Fear of current or ex partner: Not on file     Emotionally abused: Not on file     Physically abused: Not on file     Forced sexual activity: Not on file   Other Topics Concern    Not on file   Social History Narrative    Not on file        Patient has a family history includes Heart Disease in an other family member. Patient  has a past medical history of CAD (coronary artery disease), Cough, Hyperlipidemia, and Hypertension. Current Outpatient Medications   Medication Sig Dispense Refill    carvedilol (COREG) 3.125 MG tablet TAKE 1 TABLET TWICE DAILY  WITH  MEALS 180 tablet 3    lisinopril (PRINIVIL;ZESTRIL) 2.5 MG tablet TAKE 1 TABLET EVERY DAY 90 tablet 3    spironolactone (ALDACTONE) 25 MG tablet TAKE 1 TABLET EVERY DAY 90 tablet 3    simvastatin (ZOCOR) 40 MG tablet TAKE 1 TABLET EVERY NIGHT 90 tablet 2    furosemide (LASIX) 20 MG tablet Take 1 tablet by mouth daily 60 tablet 5    famotidine (PEPCID) 20 MG tablet TAKE 1 TABLET TWICE DAILY 180 tablet 3    gabapentin (NEURONTIN) 300 MG capsule Take 300 mg by mouth 2 times daily.  DULoxetine (CYMBALTA) 20 MG extended release capsule Take 20 mg by mouth daily      lidocaine (LIDODERM) 5 % Place 1 patch onto the skin daily 12 hours on, 12 hours off. 30 patch 0    aspirin 81 MG tablet Take 81 mg by mouth 2 times daily       nitroGLYCERIN (NITROSTAT) 0.4 MG SL tablet Place 1 tablet under the tongue every 5 minutes as needed 25 tablet 3    Handicap Placard MISC Duration 5 years  Dx: Coronoary Artery Disease, Old myocardial infarction 1 each 0     No current facility-administered medications for this visit. Vitals  Weight: 157 lb 12.8 oz (71.6 kg)164  Blood Pressure: BP: (126)/(68)  124/66  Pulse: 76 68      Review of Systems   Constitutional: Negative. HENT: Negative. Eyes: Negative. Respiratory: Negative. Cardiovascular: Negative. Gastrointestinal: Negative. Objective:   Physical Exam   Nursing note and vitals reviewed. Constitutional: She is oriented to person, place, and time. She appears well-developed and well-nourished.    HENT:   Head: Normocephalic and atraumatic. Eyes: Conjunctivae are normal. Pupils are equal, round, and reactive to light. Neck: Normal range of motion. Neck supple. No JVD present. No tracheal deviation present. No thyromegaly present. Cardiovascular: Normal rate, regular rhythm, normal heart sounds and intact distal pulses. Exam reveals no gallop and no friction rub. No murmur heard. Pulmonary/Chest: Effort normal. No stridor. No respiratory distress. She has no wheezes. She has no rales. She exhibits no tenderness. Abdominal: Soft. Bowel sounds are normal. She exhibits no distension and no mass. No tenderness. She has no rebound and no guarding. Musculoskeletal: She exhibits no edema and no tenderness. Lymphadenopathy:     She has no cervical adenopathy. Neurological: She is alert and oriented to person, place, and time. No cranial nerve deficit. Coordination normal.   Skin: Skin is warm and dry. No rash noted. No erythema. No pallor. Psychiatric: She has a normal mood and affect. Her behavior is normal. Judgment and thought content normal.       Assessment:      Patient Active Problem List   Diagnosis    Sarcoidosis    Mixed hyperlipidemia    Immune thrombocytopenic purpura ([address]    Essential hypertension, benign    Old myocardial infarction    Coronary atherosclerosis of native coronary artery    Sprain of wrist    Chronic ITP [facility]opathic thrombocytopenia) ([facility])    History of splenectomy    CAP (community acquired pneumonia)    Sepsis ([address]    HTN (hypertension)    GERD (gastroesophageal reflux disease)    Lactic acidosis    Streptococcal sepsis (HCC)    TIKI (acute kidney injury) ([address]    Acute systolic congestive heart failure (HCC)    Shock ([address]    Pneumonia due to Streptococcus pneumoniae ([address]    Bacteremia due to Streptococcus pneumoniae           Plan:      CAD:  Stable after remote stenting. Continue coreg and baby asa. No SL NTG use. Mild ischemic CMP:  Stable.  NO orthopnea nor left heart failure symptoms  HTN:  Controlled with lisinopril and coreg  HLP:  Controlled with simvastatin 40 daily  Edema: continue furosemide PRN. Go to lasix 10 qod with 20 qod. Breast cancer with lumpectomy chemo with neuropathy. Recent echo improved to near normal with LVEF 35-40% on 9/19 echo  She had seven chemo runs for breast CAncer and had surgery, and completed radiation. Had left tkr March 12, 2018 at [facility] and doing well. No heart issues with surgery. Did fine from heart perspective with surgery.

## 2020-09-15 ENCOUNTER — TELEPHONE (OUTPATIENT)
Dept: CARDIOLOGY CLINIC | Age: 80
End: 2020-09-15

## 2020-09-16 ENCOUNTER — OFFICE VISIT (OUTPATIENT)
Dept: ORTHOPEDIC SURGERY | Age: 80
End: 2020-09-16
Payer: MEDICARE

## 2020-09-16 VITALS — TEMPERATURE: 97.3 F | WEIGHT: 157 LBS | HEIGHT: 66 IN | BODY MASS INDEX: 25.23 KG/M2

## 2020-09-16 PROCEDURE — G8427 DOCREV CUR MEDS BY ELIG CLIN: HCPCS | Performed by: ORTHOPAEDIC SURGERY

## 2020-09-16 PROCEDURE — 20550 NJX 1 TENDON SHEATH/LIGAMENT: CPT | Performed by: ORTHOPAEDIC SURGERY

## 2020-09-16 PROCEDURE — 1090F PRES/ABSN URINE INCON ASSESS: CPT | Performed by: ORTHOPAEDIC SURGERY

## 2020-09-16 PROCEDURE — G8417 CALC BMI ABV UP PARAM F/U: HCPCS | Performed by: ORTHOPAEDIC SURGERY

## 2020-09-16 PROCEDURE — 99203 OFFICE O/P NEW LOW 30 MIN: CPT | Performed by: ORTHOPAEDIC SURGERY

## 2020-09-16 RX ORDER — FUROSEMIDE 20 MG/1
20 TABLET ORAL SEE ADMIN INSTRUCTIONS
Qty: 90 TABLET | Refills: 2 | Status: SHIPPED | OUTPATIENT
Start: 2020-09-16 | End: 2022-02-10 | Stop reason: SDUPTHER

## 2020-09-17 NOTE — PATIENT INSTRUCTIONS
Information & Instructions   After Finger, Hand, Wrist, or Elbow Injection    Bertin Deal MD    You have received an injection of local anesthetic (Bupivicaine without Epinephrine) for comfort & a steroid (Kenalog) for its strong anti-inflammatory effects. In order to give the medication a chance to reduce your inflammation and discomfort, it is recommended that you take it easy for a day or so. You may use your hand and arm as you feel comfortable, but you should avoid highly strenuous activity and heavy use for several days. Relief from the injection will often not begin for several days, and you may not feel full relief for up to one month. It is not uncommon to experience some local discomfort or pain at or around the injection site for a few days. To relieve these symptoms you may do the following if you feel necessary:       Apply ice to the affected area 20 minutes on and 20 minutes off. Do not apply ice directly to the skin. Use a thin layer (T-shirt, pillowcase, towel, etc.) to protect the skin. - If allowed by your other medical physicians, you may take -     2 Tylenol extra strength tablets every 4-6 hours       1-2 Aleve tablets twice a day     2-3 Advil tablets two to three times a day    If you are diabetic, the steroid medication may increase your blood sugar, so you are advised to monitor your sugar more closely so you can adjust it accordingly for a few days following your injection. If you need assistance with the control of you blood sugar, please contact you primary care physician for further advice. I will request that you please call the office one month after your injection at [phone] if you have not experienced relief of your symptoms (unless I have instructed you otherwise). If your injection has given you good relief of you symptoms as expected, then you only need to call the office if your symptoms return.

## 2020-09-17 NOTE — PROGRESS NOTES
symptomatic digit, absent elsewhere bilaterally  There is no evidence of gross joint instability bilaterally. Muscular strength is clinically appropriate bilaterally. Examination for Stenosing Tenosynovitis demonstrates moderate tenderness, thickening & nodularity at the A-1 pulley(s) of the Bilateral Thumb. There is a palpable Nota's Node. There is Active triggering on active flexion with pain. No other digits demonstrate evidence of Stenosing Tenosynovitis. Impression:  Ms. Candance Decree is showing clinical evidence of Stenosing Tenosynovitis (Trigger Finger) and presents requesting further treatment. Plan:    I have had a thorough discussion with Ms. Candance Decree regarding the treatment options available for her new onset Bilateral Thumb stenosing tenosynovitis, which is causing her functional limitations. I have outlined for Ms. Candance Decree the benefits and consequences of the various treatment modalities, including a reasonable expectation for the long term success and the likelihood that further more aggressive treatment may be required for her current presenting condition. Based upon our current discussion and a reasonable understating of the options available to her, Ms. Candance Decree has selected to proceed with  an injection to both Thumb Flexor Tendon Sheath. I have outlined for her the nature of the injection, and the pre, galo and post injection considerations and the appropriate expectations for this injection. I have clearly explained to her that the above outlined treatment plan should not be expected to 'cure' her stenosing tenosynovitis, but we are rather treating the symptoms with which she presents. She has understood that in order to achieve long lasting relief of her symptoms and to prevent future worsening or further damage, that definitive surgical treatment would be required.  Ms. Candance Decree  voiced an appropriate understanding of our discussion, the options available to her, and of the expectations of her selected  treatment. She did wish to proceed with Bilateral Thumb Flexor Tendon Sheath injection. Procedure:  right Thumb Trigger Finger Injection  [first Injection]: After full discussion of the nature of this process and outlining a treatment plan with Ms. Mai Tirado, we discussed the complications, limitations, expectations, alternatives, and risks of injection of the flexor tendon sheath. She understood this information well and verbally consented to this treatment. The skin of the symptomatic digit was prepped with Isopropyl Alcohol and under aseptic conditions the flexor tendon sheath was injected with a combination of 1/2 ml of 0.25% Bupivacaine without Epinephrine and 20 mg of Triamcinolone (40 mg/ml). Good filling of the flexor sheath was noted. A dry sterile bandage was applied and the patient tolerated the injection without difficulty. I advised the patient of the expected response, possible reactions and the instructions for care of the hand. \Procedure:  left Thumb Trigger Finger Injection  [first Injection]: After full discussion of the nature of this process and outlining a treatment plan with Ms. Mai Tirado, we discussed the complications, limitations, expectations, alternatives, and risks of injection of the flexor tendon sheath. She understood this information well and verbally consented to this treatment. The skin of the symptomatic digit was prepped with Isopropyl Alcohol and under aseptic conditions the flexor tendon sheath was injected with a combination of 1/2 ml of 0.25% Bupivacaine without Epinephrine and 20 mg of Triamcinolone (40 mg/ml). Good filling of the flexor sheath was noted. A dry sterile bandage was applied and the patient tolerated the injection without difficulty. I advised the patient of the expected response, possible reactions and the instructions for care of the hand.        I have also discussed with Ms. Mai Tirado the other treatment options available to her for this condition. We have today selected to proceed with treatment by injection with steroid medication. She and I have agreed that if our current course of Injection treatment does not prove to be effective over the short term future, that she will schedule a follow-up appointment to discuss and select an alternate course of therapy including possibly further conservative treatment or surgical treatment. Ms. Mai Tirado has been given a full verbal list of instructions and precautions related to her present condition. I have asked her to followup with me in the office at the prescribed time. She is also specifically requested to call or return to the office sooner if her symptoms change or worsen prior to the next scheduled appointment.

## 2020-10-22 RX ORDER — SIMVASTATIN 40 MG
TABLET ORAL
Qty: 90 TABLET | Refills: 2 | Status: SHIPPED | OUTPATIENT
Start: 2020-10-22 | End: 2021-11-26

## 2020-12-31 RX ORDER — FAMOTIDINE 20 MG/1
TABLET, FILM COATED ORAL
Qty: 180 TABLET | Refills: 3 | Status: SHIPPED | OUTPATIENT
Start: 2020-12-31 | End: 2021-10-25

## 2020-12-31 NOTE — TELEPHONE ENCOUNTER
Requested Prescriptions     Pending Prescriptions Disp Refills    famotidine (PEPCID) 20 MG tablet [Pharmacy Med Name: FAMOTIDINE 20 MG Tablet] 180 tablet 3     Sig: TAKE 1 TABLET TWICE DAILY          Number: 180    Refills: 3    Last Office Visit: 7/31/2020     Next Office Visit: 2/12/2021     Last Refill: 12/03/2019    Last Labs:04/17/2019 Digoxin/ Lipid/ CMP/ CBC

## 2021-01-13 ENCOUNTER — TELEPHONE (OUTPATIENT)
Dept: CARDIOLOGY CLINIC | Age: 81
End: 2021-01-13

## 2021-01-13 DIAGNOSIS — I50.21 ACUTE SYSTOLIC CONGESTIVE HEART FAILURE (HCC): Primary | ICD-10-CM

## 2021-01-13 NOTE — TELEPHONE ENCOUNTER
Pt calling and request a RX for her Handicap placard.  Please call [phone] when ready or if you have questions

## 2021-02-03 RX ORDER — SPIRONOLACTONE 25 MG/1
TABLET ORAL
Qty: 90 TABLET | Refills: 3 | Status: SHIPPED | OUTPATIENT
Start: 2021-02-03 | End: 2021-11-11

## 2021-02-03 RX ORDER — CARVEDILOL 3.12 MG/1
TABLET ORAL
Qty: 180 TABLET | Refills: 3 | Status: SHIPPED | OUTPATIENT
Start: 2021-02-03 | End: 2021-11-11

## 2021-02-03 RX ORDER — LISINOPRIL 2.5 MG/1
TABLET ORAL
Qty: 90 TABLET | Refills: 3 | Status: SHIPPED | OUTPATIENT
Start: 2021-02-03 | End: 2021-11-11

## 2021-02-03 NOTE — TELEPHONE ENCOUNTER
Requested Prescriptions     Pending Prescriptions Disp Refills    carvedilol (COREG) 3.125 MG tablet [Pharmacy Med Name: CARVEDILOL 3.125 MG Tablet] 180 tablet 3     Sig: TAKE 1 TABLET TWICE DAILY  WITH  MEALS    spironolactone (ALDACTONE) 25 MG tablet [Pharmacy Med Name: SPIRONOLACTONE 25 MG Tablet] 90 tablet 3     Sig: TAKE 1 TABLET EVERY DAY    lisinopril (PRINIVIL;ZESTRIL) 2.5 MG tablet [Pharmacy Med Name: LISINOPRIL 2.5 MG Tablet] 90 tablet 3     Sig: TAKE 1 TABLET EVERY DAY              Last Office Visit: 7/31/2020     Next Office Visit: 2/12/2021

## 2021-02-12 ENCOUNTER — OFFICE VISIT (OUTPATIENT)
Dept: CARDIOLOGY CLINIC | Age: 81
End: 2021-02-12
Payer: MEDICARE

## 2021-02-12 VITALS
WEIGHT: 168.6 LBS | TEMPERATURE: 97.3 F | SYSTOLIC BLOOD PRESSURE: 130 MMHG | BODY MASS INDEX: 27.21 KG/M2 | DIASTOLIC BLOOD PRESSURE: 80 MMHG | HEART RATE: 71 BPM

## 2021-02-12 DIAGNOSIS — E78.2 MIXED HYPERLIPIDEMIA: Primary | ICD-10-CM

## 2021-02-12 DIAGNOSIS — I25.10 ATHEROSCLEROSIS OF NATIVE CORONARY ARTERY OF NATIVE HEART WITHOUT ANGINA PECTORIS: ICD-10-CM

## 2021-02-12 DIAGNOSIS — I10 ESSENTIAL HYPERTENSION: ICD-10-CM

## 2021-02-12 PROCEDURE — 1090F PRES/ABSN URINE INCON ASSESS: CPT | Performed by: INTERNAL MEDICINE

## 2021-02-12 PROCEDURE — G8427 DOCREV CUR MEDS BY ELIG CLIN: HCPCS | Performed by: INTERNAL MEDICINE

## 2021-02-12 PROCEDURE — G8399 PT W/DXA RESULTS DOCUMENT: HCPCS | Performed by: INTERNAL MEDICINE

## 2021-02-12 PROCEDURE — G8484 FLU IMMUNIZE NO ADMIN: HCPCS | Performed by: INTERNAL MEDICINE

## 2021-02-12 PROCEDURE — 1123F ACP DISCUSS/DSCN MKR DOCD: CPT | Performed by: INTERNAL MEDICINE

## 2021-02-12 PROCEDURE — G8417 CALC BMI ABV UP PARAM F/U: HCPCS | Performed by: INTERNAL MEDICINE

## 2021-02-12 PROCEDURE — 1036F TOBACCO NON-USER: CPT | Performed by: INTERNAL MEDICINE

## 2021-02-12 PROCEDURE — 99214 OFFICE O/P EST MOD 30 MIN: CPT | Performed by: INTERNAL MEDICINE

## 2021-02-12 PROCEDURE — 4040F PNEUMOC VAC/ADMIN/RCVD: CPT | Performed by: INTERNAL MEDICINE

## 2021-02-12 NOTE — PROGRESS NOTES
Subjective:      Patient ID: Winsome Perez is a 79 y.o. female. CC: cad      HPI:  Patien has pna and sepsis. Lactate was 7.4 in beginning in March 2017 and was in hospital in [place] and then [facility]. Off eliquis for LLE DVT  Finished IV ATB for spine infection and finished po ATB. She has sinus pressure. No chest pain at all. Echo LVEF 25% and MPI 48% with no ischemia but apical infarct from 2000 MI and stent in mid LAD then. No chest pain. LVEF improved on recent 6/17 echo and LVEF 45-50%. No chest pressure nor orthopnea. Left knee swollen but no knee pain nor back pain. Goes to [facility] for water aerobics 2 times a week. Wants a stair lift. No Known Allergies     Social History     Socioeconomic History    Marital status:      Spouse name: Not on file    Number of children: Not on file    Years of education: Not on file    Highest education level: Not on file   Occupational History    Not on file   Social Needs    Financial resource strain: Not on file    Food insecurity     Worry: Not on file     Inability: Not on file    Transportation needs     Medical: Not on file     Non-medical: Not on file   Tobacco Use    Smoking status: Never Smoker    Smokeless tobacco: Never Used   Substance and Sexual Activity    Alcohol use: No    Drug use: No    Sexual activity: Not on file     Comment: .    Lifestyle    Physical activity     Days per week: Not on file     Minutes per session: Not on file    Stress: Not on file   Relationships    Social connections     Talks on phone: Not on file     Gets together: Not on file     Attends Taoist service: Not on file     Active member of club or organization: Not on file     Attends meetings of clubs or organizations: Not on file     Relationship status: Not on file    Intimate partner violence     Fear of current or ex partner: Not on file     Emotionally abused: Not on file     Physically abused: Not on file     Forced sexual activity: Not on file   Other Topics Concern    Not on file   Social History Narrative    Not on file        Patient has a family history includes Heart Disease in an other family member. Patient  has a past medical history of CAD (coronary artery disease), Cough, Hyperlipidemia, and Hypertension. Current Outpatient Medications   Medication Sig Dispense Refill    carvedilol (COREG) 3.125 MG tablet TAKE 1 TABLET TWICE DAILY  WITH  MEALS 180 tablet 3    spironolactone (ALDACTONE) 25 MG tablet TAKE 1 TABLET EVERY DAY 90 tablet 3    lisinopril (PRINIVIL;ZESTRIL) 2.5 MG tablet TAKE 1 TABLET EVERY DAY 90 tablet 3    famotidine (PEPCID) 20 MG tablet TAKE 1 TABLET TWICE DAILY 180 tablet 3    simvastatin (ZOCOR) 40 MG tablet TAKE 1 TABLET EVERY NIGHT 90 tablet 2    furosemide (LASIX) 20 MG tablet Take 1 tablet by mouth See Admin Instructions Take Lasix 10 mg every other day with Lasix 20 mg every other day 90 tablet 2    gabapentin (NEURONTIN) 300 MG capsule Take 300 mg by mouth 2 times daily.  DULoxetine (CYMBALTA) 20 MG extended release capsule Take 20 mg by mouth daily      lidocaine (LIDODERM) 5 % Place 1 patch onto the skin daily 12 hours on, 12 hours off. 30 patch 0    aspirin 81 MG tablet Take 81 mg by mouth 2 times daily       nitroGLYCERIN (NITROSTAT) 0.4 MG SL tablet Place 1 tablet under the tongue every 5 minutes as needed 25 tablet 3    Handicap Placard MISC Duration 5 years  Dx: Coronoary Artery Disease, Old myocardial infarction 1 each 0     No current facility-administered medications for this visit. Vitals  Weight: 168 lb 9.6 oz (76.5 kg)164  Blood Pressure: BP: (130)/(80)  124/66  Pulse: 71 68      Review of Systems   Constitutional: Negative. HENT: Negative. Eyes: Negative. Respiratory: Negative. Cardiovascular: Negative. Gastrointestinal: Negative. Objective:   Physical Exam   Nursing note and vitals reviewed.   Constitutional: She is oriented to person, place, and time. She appears well-developed and well-nourished. HENT:   Head: Normocephalic and atraumatic. Eyes: Conjunctivae are normal. Pupils are equal, round, and reactive to light. Neck: Normal range of motion. Neck supple. No JVD present. No tracheal deviation present. No thyromegaly present. Cardiovascular: Normal rate, regular rhythm, normal heart sounds and intact distal pulses. Exam reveals no gallop and no friction rub. No murmur heard. Pulmonary/Chest: Effort normal. No stridor. No respiratory distress. She has no wheezes. She has no rales. She exhibits no tenderness. Abdominal: Soft. Bowel sounds are normal. She exhibits no distension and no mass. No tenderness. She has no rebound and no guarding. Musculoskeletal: She exhibits no edema and no tenderness. Lymphadenopathy:     She has no cervical adenopathy. Neurological: She is alert and oriented to person, place, and time. No cranial nerve deficit. Coordination normal.   Skin: Skin is warm and dry. No rash noted. No erythema. No pallor. Psychiatric: She has a normal mood and affect. Her behavior is normal. Judgment and thought content normal.       Assessment:      Patient Active Problem List   Diagnosis    Sarcoidosis    Mixed hyperlipidemia    Immune thrombocytopenic purpura ([address]    Old myocardial infarction    Coronary atherosclerosis of native coronary artery    Sprain of wrist    Chronic ITP (idiopathic thrombocytopenia) (HCC)    History of splenectomy    CAP (community acquired pneumonia)    Sepsis ([address]    HTN (hypertension)    GERD (gastroesophageal reflux disease)    Lactic acidosis    Streptococcal sepsis (HCC)    TIKI (acute kidney injury) ([address]    Acute systolic congestive heart failure (HCC)    Shock ([address]    Pneumonia due to Streptococcus pneumoniae ([address]    Bacteremia due to Streptococcus pneumoniae           Plan:      CAD:  Stable after remote stenting. Continue coreg and baby asa.  No SL NTG use. Mild ischemic CMP:  Stable. NO orthopnea nor left heart failure symptoms  HTN:  Controlled with lisinopril and coreg  HLP:  Controlled with simvastatin 40 daily  Edema: continue furosemide PRN. Go to lasix prn  Breast cancer with lumpectomy chemo with neuropathy. Recent echo improved to near normal with LVEF 35-40% on 9/19 echo  She had seven chemo runs for breast CAncer and had surgery, and completed radiation. Had left tkr March 12, 2018 at [facility] and doing well. No heart issues with surgery. Did fine from heart perspective with surgery. Check labs. Continue current medications. Stable cardiovascular status.

## 2021-02-22 LAB
ALBUMIN SERPL-MCNC: 4 G/DL (ref 3.5–5.7)
ALP BLD-CCNC: 38 U/L (ref 36–125)
ALT SERPL-CCNC: 12 U/L (ref 7–52)
ANION GAP SERPL CALCULATED.3IONS-SCNC: 5 MMOL/L (ref 3–16)
AST SERPL-CCNC: 19 U/L (ref 13–39)
BASOPHILS ABSOLUTE: 22 /UL (ref 0–200)
BASOPHILS RELATIVE PERCENT: 0.6 % (ref 0–1)
BILIRUB SERPL-MCNC: 0.5 MG/DL (ref 0–1.5)
BUN BLDV-MCNC: 19 MG/DL (ref 7–25)
CALCIUM SERPL-MCNC: 9.9 MG/DL (ref 8.6–10.3)
CHLORIDE BLD-SCNC: 107 MMOL/L (ref 98–110)
CHOLESTEROL, TOTAL: 157 MG/DL (ref 0–200)
CO2: 30 MMOL/L (ref 21–33)
CREAT SERPL-MCNC: 0.95 MG/DL (ref 0.6–1.3)
EOSINOPHILS ABSOLUTE: 180 /UL (ref 15–500)
EOSINOPHILS RELATIVE PERCENT: 5 % (ref 0–8)
GFR, ESTIMATED: 56 SEE NOTE.
GFR, ESTIMATED: 65 SEE NOTE.
GLUCOSE BLD-MCNC: 109 MG/DL (ref 70–100)
HCT VFR BLD CALC: 38.3 % (ref 35–45)
HDLC SERPL-MCNC: 59 MG/DL (ref 60–92)
HEMOGLOBIN: 12.8 G/DL (ref 11.7–15.5)
LDL CHOLESTEROL CALCULATED: 86 MG/DL
LYMPHOCYTES ABSOLUTE: 796 /UL (ref 850–3900)
LYMPHOCYTES RELATIVE PERCENT: 22.1 % (ref 15–45)
MCH RBC QN AUTO: 33.4 PG (ref 27–33)
MCHC RBC AUTO-ENTMCNC: 33.3 G/DL (ref 32–36)
MCV RBC AUTO: 100.1 FL (ref 80–100)
MONOCYTES ABSOLUTE: 475 /UL (ref 200–950)
MONOCYTES RELATIVE PERCENT: 13.2 % (ref 0–12)
NEUTROPHILS ABSOLUTE: 2128 /UL (ref 1500–7800)
NUCLEATED RED BLOOD CELLS: 0 /100 WBC (ref 0–0)
OSMOLALITY CALCULATION: 297 MOSM/KG (ref 278–305)
PDW BLD-RTO: 13.4 % (ref 11–15)
PLATELET # BLD: 120 10E3/UL (ref 140–400)
PMV BLD AUTO: 9 FL (ref 7.5–11.5)
POTASSIUM SERPL-SCNC: 5 MMOL/L (ref 3.5–5.3)
RBC # BLD: 3.83 10E6/UL (ref 3.8–5.1)
SEGMENTED NEUTROPHILS RELATIVE PERCENT: 59.1 % (ref 40–80)
SODIUM BLD-SCNC: 142 MMOL/L (ref 133–146)
TOTAL PROTEIN: 7.4 G/DL (ref 6.4–8.9)
TRIGL SERPL-MCNC: 62 MG/DL (ref 10–149)
WBC # BLD: 3.6 10E3/UL (ref 3.8–10.8)

## 2021-08-20 ENCOUNTER — OFFICE VISIT (OUTPATIENT)
Dept: CARDIOLOGY CLINIC | Age: 81
End: 2021-08-20
Payer: MEDICARE

## 2021-08-20 VITALS
HEART RATE: 81 BPM | WEIGHT: 165 LBS | DIASTOLIC BLOOD PRESSURE: 68 MMHG | SYSTOLIC BLOOD PRESSURE: 130 MMHG | BODY MASS INDEX: 26.63 KG/M2

## 2021-08-20 DIAGNOSIS — I25.10 CORONARY ARTERY DISEASE INVOLVING NATIVE CORONARY ARTERY OF NATIVE HEART WITHOUT ANGINA PECTORIS: Primary | ICD-10-CM

## 2021-08-20 DIAGNOSIS — E78.5 HYPERLIPIDEMIA, UNSPECIFIED HYPERLIPIDEMIA TYPE: ICD-10-CM

## 2021-08-20 DIAGNOSIS — I10 HTN (HYPERTENSION), BENIGN: ICD-10-CM

## 2021-08-20 PROCEDURE — G8399 PT W/DXA RESULTS DOCUMENT: HCPCS | Performed by: INTERNAL MEDICINE

## 2021-08-20 PROCEDURE — 1090F PRES/ABSN URINE INCON ASSESS: CPT | Performed by: INTERNAL MEDICINE

## 2021-08-20 PROCEDURE — 99214 OFFICE O/P EST MOD 30 MIN: CPT | Performed by: INTERNAL MEDICINE

## 2021-08-20 PROCEDURE — G8417 CALC BMI ABV UP PARAM F/U: HCPCS | Performed by: INTERNAL MEDICINE

## 2021-08-20 PROCEDURE — 1036F TOBACCO NON-USER: CPT | Performed by: INTERNAL MEDICINE

## 2021-08-20 PROCEDURE — 1123F ACP DISCUSS/DSCN MKR DOCD: CPT | Performed by: INTERNAL MEDICINE

## 2021-08-20 PROCEDURE — G8427 DOCREV CUR MEDS BY ELIG CLIN: HCPCS | Performed by: INTERNAL MEDICINE

## 2021-08-20 PROCEDURE — 4040F PNEUMOC VAC/ADMIN/RCVD: CPT | Performed by: INTERNAL MEDICINE

## 2021-08-20 NOTE — PROGRESS NOTES
Subjective:          Subjective:      Patient ID: Isabella Watt is a 79 y.o. female. CC: cad      HPI:  Patien has pna and sepsis. Lactate was 7.4 in beginning in March 2017 and was in hospital in [place] and then [facility]. Off eliquis for LLE DVT  Finished IV ATB for spine infection and finished po ATB. She has sinus pressure. No chest pain at all. Echo LVEF 25% and MPI 48% with no ischemia but apical infarct from 2000 MI and stent in mid LAD then. No chest pain. LVEF improved on recent 6/17 echo and LVEF 45-50%. No chest pressure nor orthopnea. Left knee swollen but no knee pain nor back pain. Goes to [facility] for water aerobics 2 times a week. Wants a stair lift. No Known Allergies     Social History     Socioeconomic History    Marital status:      Spouse name: Not on file    Number of children: Not on file    Years of education: Not on file    Highest education level: Not on file   Occupational History    Not on file   Tobacco Use    Smoking status: Never Smoker    Smokeless tobacco: Never Used   Substance and Sexual Activity    Alcohol use: No    Drug use: No    Sexual activity: Not on file     Comment: . Other Topics Concern    Not on file   Social History Narrative    Not on file     Social Determinants of Health     Financial Resource Strain:     Difficulty of Paying Living Expenses:    Food Insecurity:     Worried About Running Out of Food in the Last Year:     [address] in the Last Year:    Transportation Needs:     Lack of Transportation (Medical):      Lack of Transportation (Non-Medical):    Physical Activity:     Days of Exercise per Week:     Minutes of Exercise per Session:    Stress:     Feeling of Stress :    Social Connections:     Frequency of Communication with Friends and Family:     Frequency of Social Gatherings with Friends and Family:     Attends Cheondoism Services:     Active Member of Clubs or Organizations:     Attends Club or Organization Meetings:     Marital Status:    Intimate Partner Violence:     Fear of Current or Ex-Partner:     Emotionally Abused:     Physically Abused:     Sexually Abused:         Patient has a family history includes Heart Disease in an other family member. Patient  has a past medical history of CAD (coronary artery disease), Cough, Hyperlipidemia, and Hypertension. Current Outpatient Medications   Medication Sig Dispense Refill    carvedilol (COREG) 3.125 MG tablet TAKE 1 TABLET TWICE DAILY  WITH  MEALS 180 tablet 3    spironolactone (ALDACTONE) 25 MG tablet TAKE 1 TABLET EVERY DAY 90 tablet 3    lisinopril (PRINIVIL;ZESTRIL) 2.5 MG tablet TAKE 1 TABLET EVERY DAY 90 tablet 3    famotidine (PEPCID) 20 MG tablet TAKE 1 TABLET TWICE DAILY 180 tablet 3    simvastatin (ZOCOR) 40 MG tablet TAKE 1 TABLET EVERY NIGHT 90 tablet 2    furosemide (LASIX) 20 MG tablet Take 1 tablet by mouth See Admin Instructions Take Lasix 10 mg every other day with Lasix 20 mg every other day 90 tablet 2    gabapentin (NEURONTIN) 300 MG capsule Take 300 mg by mouth 2 times daily.  DULoxetine (CYMBALTA) 20 MG extended release capsule Take 20 mg by mouth daily      lidocaine (LIDODERM) 5 % Place 1 patch onto the skin daily 12 hours on, 12 hours off. 30 patch 0    aspirin 81 MG tablet Take 81 mg by mouth 2 times daily       nitroGLYCERIN (NITROSTAT) 0.4 MG SL tablet Place 1 tablet under the tongue every 5 minutes as needed 25 tablet 3    Handicap Placard MISC Duration 5 years  Dx: Coronoary Artery Disease, Old myocardial infarction 1 each 0     No current facility-administered medications for this visit. Vitals  Weight: 165 lb (74.8 kg)164  Blood Pressure: BP: (130)/(68)  124/66  Pulse: 81 68      Review of Systems   Constitutional: Negative. HENT: Negative. Eyes: Negative. Respiratory: Negative. Cardiovascular: Negative. Gastrointestinal: Negative.       Exam unchanged from 2/12/21. Objective:   Physical Exam   Nursing note and vitals reviewed. Constitutional: She is oriented to person, place, and time. She appears well-developed and well-nourished. HENT:   Head: Normocephalic and atraumatic. Eyes: Conjunctivae are normal. Pupils are equal, round, and reactive to light. Neck: Normal range of motion. Neck supple. No JVD present. No tracheal deviation present. No thyromegaly present. Cardiovascular: Normal rate, regular rhythm, normal heart sounds and intact distal pulses. Exam reveals no gallop and no friction rub. No murmur heard. Pulmonary/Chest: Effort normal. No stridor. No respiratory distress. She has no wheezes. She has no rales. She exhibits no tenderness. Abdominal: Soft. Bowel sounds are normal. She exhibits no distension and no mass. No tenderness. She has no rebound and no guarding. Musculoskeletal: She exhibits no edema and no tenderness. Lymphadenopathy:     She has no cervical adenopathy. Neurological: She is alert and oriented to person, place, and time. No cranial nerve deficit. Coordination normal.   Skin: Skin is warm and dry. No rash noted. No erythema. No pallor. Psychiatric: She has a normal mood and affect.  Her behavior is normal. Judgment and thought content normal.       Assessment:      Patient Active Problem List   Diagnosis    Sarcoidosis    Mixed hyperlipidemia    Immune thrombocytopenic purpura [facility]    Old myocardial infarction    Coronary atherosclerosis of native coronary artery    Sprain of wrist    Chronic ITP (idiopathic thrombocytopenia) ([facility])    History of splenectomy    CAP (community acquired pneumonia)    Sepsis ([facility]    HTN (hypertension)    GERD (gastroesophageal reflux disease)    Lactic acidosis    Streptococcal sepsis ([facility])    TIKI (acute kidney injury) ([facility]    Acute systolic congestive heart failure ([facility])    Shock ([address]    Pneumonia due to Streptococcus pneumoniae ([facility]    Bacteremia due to Streptococcus pneumoniae           Plan:      CAD:  Stable after remote stenting. Continue coreg and baby asa. No SL NTG use. Mild ischemic CMP:  Stable. NO orthopnea nor left heart failure symptoms  HTN:  Controlled with lisinopril and coreg  HLP:  Controlled with simvastatin 40 daily  Edema: continue furosemide PRN. Go to lasix prn  Breast cancer with lumpectomy chemo with neuropathy. Recent echo improved to near normal with LVEF 35-40% on 9/19 echo  She had seven chemo runs for breast CAncer and had surgery, and completed radiation. Had left tkr March 12, 2018 at [facility] and doing well. No heart issues with surgery. Did fine from heart perspective with surgery. Check labs. Continue current medications. Stable cardiovascular status. Doing well.

## 2021-10-26 RX ORDER — FAMOTIDINE 20 MG/1
TABLET, FILM COATED ORAL
Qty: 180 TABLET | Refills: 3 | Status: SHIPPED | OUTPATIENT
Start: 2021-10-26 | End: 2021-11-02 | Stop reason: SDUPTHER

## 2021-11-02 NOTE — TELEPHONE ENCOUNTER
Requested Prescriptions     Pending Prescriptions Disp Refills    famotidine (PEPCID) 20 MG tablet 180 tablet 3     Sig: TAKE 1 TABLET TWICE DAILY    simvastatin (ZOCOR) 40 MG tablet 90 tablet 2     Sig: TAKE 1 TABLET EVERY NIGHT     Last visit : 8/5/21    Next Visit : 3/3/22

## 2021-11-03 RX ORDER — FAMOTIDINE 20 MG/1
TABLET, FILM COATED ORAL
Qty: 180 TABLET | Refills: 3 | Status: SHIPPED | OUTPATIENT
Start: 2021-11-03 | End: 2022-09-29

## 2021-11-03 RX ORDER — SIMVASTATIN 40 MG
TABLET ORAL
Qty: 90 TABLET | Refills: 2 | OUTPATIENT
Start: 2021-11-03

## 2021-11-11 RX ORDER — CARVEDILOL 3.12 MG/1
TABLET ORAL
Qty: 180 TABLET | Refills: 3 | Status: SHIPPED | OUTPATIENT
Start: 2021-11-11

## 2021-11-11 RX ORDER — SPIRONOLACTONE 25 MG/1
TABLET ORAL
Qty: 90 TABLET | Refills: 3 | Status: SHIPPED | OUTPATIENT
Start: 2021-11-11

## 2021-11-11 RX ORDER — LISINOPRIL 2.5 MG/1
TABLET ORAL
Qty: 90 TABLET | Refills: 3 | Status: SHIPPED | OUTPATIENT
Start: 2021-11-11 | End: 2022-10-03 | Stop reason: ALTCHOICE

## 2021-11-11 NOTE — TELEPHONE ENCOUNTER
Requested Prescriptions     Pending Prescriptions Disp Refills    lisinopril (PRINIVIL;ZESTRIL) 2.5 MG tablet [Pharmacy Med Name: LISINOPRIL 2.5 MG Tablet] 90 tablet 3     Sig: TAKE 1 TABLET EVERY DAY    carvedilol (COREG) 3.125 MG tablet [Pharmacy Med Name: CARVEDILOL 3.125 MG Tablet] 180 tablet 3     Sig: TAKE 1 TABLET TWICE DAILY  WITH  MEALS    spironolactone (ALDACTONE) 25 MG tablet [Pharmacy Med Name: SPIRONOLACTONE 25 MG Tablet] 90 tablet 3     Sig: TAKE 1 TABLET EVERY DAY                  Last Office Visit: 8/20/2021     Next Office Visit: 3/3/2022

## 2021-11-17 ENCOUNTER — OFFICE VISIT (OUTPATIENT)
Dept: ORTHOPEDIC SURGERY | Age: 81
End: 2021-11-17
Payer: MEDICARE

## 2021-11-17 VITALS — HEIGHT: 66 IN | RESPIRATION RATE: 16 BRPM | BODY MASS INDEX: 25.71 KG/M2 | WEIGHT: 160 LBS

## 2021-11-17 DIAGNOSIS — M65.30 TRIGGER FINGER, ACQUIRED: Primary | ICD-10-CM

## 2021-11-17 PROCEDURE — 4040F PNEUMOC VAC/ADMIN/RCVD: CPT | Performed by: ORTHOPAEDIC SURGERY

## 2021-11-17 PROCEDURE — 20550 NJX 1 TENDON SHEATH/LIGAMENT: CPT | Performed by: ORTHOPAEDIC SURGERY

## 2021-11-17 PROCEDURE — 99213 OFFICE O/P EST LOW 20 MIN: CPT | Performed by: ORTHOPAEDIC SURGERY

## 2021-11-17 PROCEDURE — G8484 FLU IMMUNIZE NO ADMIN: HCPCS | Performed by: ORTHOPAEDIC SURGERY

## 2021-11-17 PROCEDURE — 1123F ACP DISCUSS/DSCN MKR DOCD: CPT | Performed by: ORTHOPAEDIC SURGERY

## 2021-11-17 PROCEDURE — G8427 DOCREV CUR MEDS BY ELIG CLIN: HCPCS | Performed by: ORTHOPAEDIC SURGERY

## 2021-11-17 PROCEDURE — 1036F TOBACCO NON-USER: CPT | Performed by: ORTHOPAEDIC SURGERY

## 2021-11-17 PROCEDURE — 1090F PRES/ABSN URINE INCON ASSESS: CPT | Performed by: ORTHOPAEDIC SURGERY

## 2021-11-17 PROCEDURE — G8417 CALC BMI ABV UP PARAM F/U: HCPCS | Performed by: ORTHOPAEDIC SURGERY

## 2021-11-17 PROCEDURE — G8399 PT W/DXA RESULTS DOCUMENT: HCPCS | Performed by: ORTHOPAEDIC SURGERY

## 2021-11-17 RX ORDER — TRIAMCINOLONE ACETONIDE 40 MG/ML
20 INJECTION, SUSPENSION INTRA-ARTICULAR; INTRAMUSCULAR ONCE
Status: CANCELLED | OUTPATIENT
Start: 2021-11-17 | End: 2021-11-17

## 2021-11-17 RX ORDER — TRIAMCINOLONE ACETONIDE 40 MG/ML
20 INJECTION, SUSPENSION INTRA-ARTICULAR; INTRAMUSCULAR ONCE
Status: COMPLETED | OUTPATIENT
Start: 2021-11-17 | End: 2021-11-17

## 2021-11-17 RX ORDER — LIDOCAINE HYDROCHLORIDE 10 MG/ML
0.5 INJECTION, SOLUTION INFILTRATION; PERINEURAL ONCE
Status: COMPLETED | OUTPATIENT
Start: 2021-11-17 | End: 2021-11-17

## 2021-11-17 RX ORDER — LIDOCAINE HYDROCHLORIDE 10 MG/ML
0.5 INJECTION, SOLUTION INFILTRATION; PERINEURAL ONCE
Status: CANCELLED | OUTPATIENT
Start: 2021-11-17 | End: 2021-11-17

## 2021-11-17 RX ADMIN — TRIAMCINOLONE ACETONIDE 20 MG: 40 INJECTION, SUSPENSION INTRA-ARTICULAR; INTRAMUSCULAR at 15:49

## 2021-11-17 RX ADMIN — LIDOCAINE HYDROCHLORIDE 0.5 ML: 10 INJECTION, SOLUTION INFILTRATION; PERINEURAL at 15:49

## 2021-11-17 NOTE — PROGRESS NOTES
We last examined this patient 1+ year ago at which time Dr. Bal Cruz injected the left thumb for treatment of trigger finger. She obtained prompt and complete relief of all symptoms. Unfortunately, the condition has recurred in the thumb as well as began in the right thumb and ring finger. and the patient returns to the office requesting additional treatment. She complains of pain, swelling, catching and stiffness of the digits for the past 2 months. Symptoms have become worse recently. The patient's social history, past medical history, family history, medications, allergies and review of systems have been reviewed, dated 11/17/21 and are recorded in the chart. I have personally examined and reviewed all previous imaging studies, laboratory tests and medical encounters pertinent to this patient's visit today. On examination there is mild soft tissue swelling of the digits. There is no deformity. There is tenderness, thickening and nodularity at the base of the flexor tendon sheaths. Range of motion is slightly limited in flexion and extension. The digits stick in flexion and pop into extension, accompanied by some pain. Skin is intact without lesions. Distal circulation and sensation are intact. Muscle strength and coordination are normal.  Reflexes and present bilaterally. Joints are stable. There are no subcutaneous nodules or enlarged epitrochlear lymph nodes. Impression: Recurrent left thumb trigger digit. Right thumb and ring finger trigger digits. The nature of this medical problem is fully discussed with the patient, including all treatment options. All questions are answered. The left  hand is prepared with Betadine and alcohol and the flexor tendon sheath of the left thumb is injected with 1/2 milliliter of 1% lidocaine and 20 mg.of triamcinalone, with good filling.   The patient is advised regarding the expected response and possible reactions from the injection. The patient is given a return appointment for 3 weeks for injections of the right thumb and ring finger. and is instructed to call sooner if there are any questions or problems.

## 2021-11-29 RX ORDER — SIMVASTATIN 40 MG
TABLET ORAL
Qty: 90 TABLET | Refills: 2 | Status: SHIPPED | OUTPATIENT
Start: 2021-11-29 | End: 2022-07-22 | Stop reason: SDUPTHER

## 2022-01-31 NOTE — TELEPHONE ENCOUNTER
Requested Prescriptions     Pending Prescriptions Disp Refills    famotidine (PEPCID) 20 MG tablet [Pharmacy Med Name: FAMOTIDINE 20 MG Tablet] 180 tablet 3     Sig: TAKE 1 TABLET TWICE DAILY                Last Office Visit: 8/20/2021     Next Office Visit: 10/23/2021 Xelmelvaz Pregnancy And Lactation Text: This medication is Pregnancy Category D and is not considered safe during pregnancy.  The risk during breast feeding is also uncertain.

## 2022-02-10 ENCOUNTER — OFFICE VISIT (OUTPATIENT)
Dept: CARDIOLOGY CLINIC | Age: 82
End: 2022-02-10
Payer: MEDICARE

## 2022-02-10 VITALS
WEIGHT: 171.6 LBS | DIASTOLIC BLOOD PRESSURE: 68 MMHG | BODY MASS INDEX: 27.7 KG/M2 | SYSTOLIC BLOOD PRESSURE: 134 MMHG | HEART RATE: 72 BPM

## 2022-02-10 DIAGNOSIS — I25.10 CORONARY ARTERY DISEASE INVOLVING NATIVE CORONARY ARTERY OF NATIVE HEART WITHOUT ANGINA PECTORIS: ICD-10-CM

## 2022-02-10 DIAGNOSIS — I25.2 OLD MYOCARDIAL INFARCTION: Primary | ICD-10-CM

## 2022-02-10 DIAGNOSIS — R60.1 GENERALIZED EDEMA: ICD-10-CM

## 2022-02-10 DIAGNOSIS — E78.5 HYPERLIPIDEMIA, UNSPECIFIED HYPERLIPIDEMIA TYPE: ICD-10-CM

## 2022-02-10 DIAGNOSIS — I10 HTN (HYPERTENSION), BENIGN: ICD-10-CM

## 2022-02-10 PROCEDURE — 93000 ELECTROCARDIOGRAM COMPLETE: CPT | Performed by: INTERNAL MEDICINE

## 2022-02-10 PROCEDURE — 99214 OFFICE O/P EST MOD 30 MIN: CPT | Performed by: INTERNAL MEDICINE

## 2022-02-10 PROCEDURE — 1090F PRES/ABSN URINE INCON ASSESS: CPT | Performed by: INTERNAL MEDICINE

## 2022-02-10 PROCEDURE — 1036F TOBACCO NON-USER: CPT | Performed by: INTERNAL MEDICINE

## 2022-02-10 PROCEDURE — G8427 DOCREV CUR MEDS BY ELIG CLIN: HCPCS | Performed by: INTERNAL MEDICINE

## 2022-02-10 PROCEDURE — G8417 CALC BMI ABV UP PARAM F/U: HCPCS | Performed by: INTERNAL MEDICINE

## 2022-02-10 PROCEDURE — 1123F ACP DISCUSS/DSCN MKR DOCD: CPT | Performed by: INTERNAL MEDICINE

## 2022-02-10 PROCEDURE — G8399 PT W/DXA RESULTS DOCUMENT: HCPCS | Performed by: INTERNAL MEDICINE

## 2022-02-10 PROCEDURE — 4040F PNEUMOC VAC/ADMIN/RCVD: CPT | Performed by: INTERNAL MEDICINE

## 2022-02-10 PROCEDURE — G8484 FLU IMMUNIZE NO ADMIN: HCPCS | Performed by: INTERNAL MEDICINE

## 2022-02-10 RX ORDER — FUROSEMIDE 20 MG/1
20 TABLET ORAL SEE ADMIN INSTRUCTIONS
Qty: 90 TABLET | Refills: 3 | Status: SHIPPED | OUTPATIENT
Start: 2022-02-10

## 2022-02-10 NOTE — PROGRESS NOTES
Subjective:          Subjective:      Patient ID: Patrizia Benitez is a 79 y.o. female. CC: cad      HPI:  Patien has pna and sepsis. Lactate was 7.4 in beginning in March 2017 and was in hospital in [place] and then [facility]. Off eliquis for LLE DVT  Finished IV ATB for spine infection and finished po ATB. She has sinus pressure. No chest pain at all. Echo LVEF 25% and MPI 48% with no ischemia but apical infarct from 2000 MI and stent in mid LAD then. No chest pain. LVEF improved on recent 6/17 echo and LVEF 45-50%. No chest pressure nor orthopnea. Left knee swollen but no knee pain nor back pain. Still Goes to [facility] for water aerobics 2 times a week. She received a stair lift. No Known Allergies     Social History     Socioeconomic History    Marital status:      Spouse name: Not on file    Number of children: Not on file    Years of education: Not on file    Highest education level: Not on file   Occupational History    Not on file   Tobacco Use    Smoking status: Never Smoker    Smokeless tobacco: Never Used   Substance and Sexual Activity    Alcohol use: No    Drug use: No    Sexual activity: Not on file     Comment: . Other Topics Concern    Not on file   Social History Narrative    Not on file     Social Determinants of Health     Financial Resource Strain:     Difficulty of Paying Living Expenses: Not on file   Food Insecurity:     Worried About Running Out of Food in the Last Year: Not on file    Dewey of Food in the Last Year: Not on file   Transportation Needs:     Lack of Transportation (Medical): Not on file    Lack of Transportation (Non-Medical):  Not on file   Physical Activity:     Days of Exercise per Week: Not on file    Minutes of Exercise per Session: Not on file   Stress:     Feeling of Stress : Not on file   Social Connections:     Frequency of Communication with Friends and Family: Not on file    Frequency of Social Gatherings with Friends and Family: Not on file    Attends Protestant Services: Not on file    Active Member of Clubs or Organizations: Not on file    Attends Club or Organization Meetings: Not on file    Marital Status: Not on file   Intimate Partner Violence:     Fear of Current or Ex-Partner: Not on file    Emotionally Abused: Not on file    Physically Abused: Not on file    Sexually Abused: Not on file   Housing Stability:     Unable to Pay for Housing in the Last Year: Not on file    Number of Places Lived in the Last Year: Not on file    Unstable Housing in the Last Year: Not on file        Patient has a family history includes Heart Disease in an other family member. Patient  has a past medical history of CAD (coronary artery disease), Cough, Hyperlipidemia, and Hypertension. Current Outpatient Medications   Medication Sig Dispense Refill    furosemide (LASIX) 20 MG tablet Take 1 tablet by mouth See Admin Instructions Take 1 tablet Monday Wednesday and Friday. 90 tablet 3    simvastatin (ZOCOR) 40 MG tablet TAKE 1 TABLET EVERY NIGHT 90 tablet 2    lisinopril (PRINIVIL;ZESTRIL) 2.5 MG tablet TAKE 1 TABLET EVERY DAY 90 tablet 3    carvedilol (COREG) 3.125 MG tablet TAKE 1 TABLET TWICE DAILY  WITH  MEALS 180 tablet 3    spironolactone (ALDACTONE) 25 MG tablet TAKE 1 TABLET EVERY DAY 90 tablet 3    famotidine (PEPCID) 20 MG tablet TAKE 1 TABLET TWICE DAILY 180 tablet 3    gabapentin (NEURONTIN) 300 MG capsule Take 300 mg by mouth 2 times daily.  DULoxetine (CYMBALTA) 20 MG extended release capsule Take 20 mg by mouth daily      lidocaine (LIDODERM) 5 % Place 1 patch onto the skin daily 12 hours on, 12 hours off.  30 patch 0    aspirin 81 MG tablet Take 81 mg by mouth 2 times daily       nitroGLYCERIN (NITROSTAT) 0.4 MG SL tablet Place 1 tablet under the tongue every 5 minutes as needed 25 tablet 3    Handicap Placard MISC Duration 5 years  Dx: Coronoary Artery Disease, Old myocardial infarction 1 each 0     No current facility-administered medications for this visit. Vitals  Weight: 171 lb 9.6 oz (77.8 kg)164  Blood Pressure: BP: (134)/(68)  124/66  Pulse: 72 68      Review of Systems   Constitutional: Negative. HENT: Negative. Eyes: Negative. Respiratory: Negative. Cardiovascular: Negative. Gastrointestinal: Negative. Exam unchanged from 2/12/21. Objective:   Physical Exam   Nursing note and vitals reviewed. Constitutional: She is oriented to person, place, and time. She appears well-developed and well-nourished. HENT:   Head: Normocephalic and atraumatic. Eyes: Conjunctivae are normal. Pupils are equal, round, and reactive to light. Neck: Normal range of motion. Neck supple. No JVD present. No tracheal deviation present. No thyromegaly present. Cardiovascular: Normal rate, regular rhythm, normal heart sounds and intact distal pulses. Exam reveals no gallop and no friction rub. No murmur heard. Pulmonary/Chest: Effort normal. No stridor. No respiratory distress. She has no wheezes. She has no rales. She exhibits no tenderness. Abdominal: Soft. Bowel sounds are normal. She exhibits no distension and no mass. No tenderness. She has no rebound and no guarding. Musculoskeletal: She exhibits no edema and no tenderness. Lymphadenopathy:     She has no cervical adenopathy. Neurological: She is alert and oriented to person, place, and time. No cranial nerve deficit. Coordination normal.   Skin: Skin is warm and dry. No rash noted. No erythema. No pallor. Psychiatric: She has a normal mood and affect.  Her behavior is normal. Judgment and thought content normal.       Assessment:      Patient Active Problem List   Diagnosis    Sarcoidosis    Mixed hyperlipidemia    [facility]open[facility] pur[facility]    Old myocardial infarction    Coronary atherosclerosis of native coronary artery    Sprain of wrist    Chronic ITP (idiopathic thrombocytopenia) (HCC)    History of splenectomy    CAP (community acquired pneumonia)    Sepsis ([facility]    HTN (hypertension)    GERD (gastroesophageal reflux disease)    Lactic acidosis    Streptococcal sepsis (HCC)    TIKI (acute kidney injury) ([facility]    Acute systolic congestive heart failure (HCC)    Shock ([facility]    [facility] to [facility]    Bacteremia due to Streptococcus pneumoniae    Trigger finger, acquired      Diagnosis Orders   1. Old myocardial infarction  EKG 12 lead   2. Coronary artery disease involving native coronary artery of native heart without angina pectoris     3. HTN (hypertension), benign     4. Hyperlipidemia, unspecified hyperlipidemia type     5. Generalized edema             Plan:      CAD:  Stable after remote stenting. Continue coreg and baby asa. No SL NTG use. Mild ischemic CMP:  Stable. NO orthopnea nor left heart failure symptoms  HTN:  Controlled with lisinopril and coreg and this is to continue. HLP:  Controlled and continue simvastatin 40 daily  Edema: continue furosemide PRN. Go to lasix prn  Breast cancer with lumpectomy chemo with neuropathy. Recent echo improved to near normal with LVEF 35-40% on 9/19 echo  She had seven chemo runs for breast CAncer and had surgery, and completed radiation. Had left tkr March 12, 2018 at [facility] and doing well. No heart issues with surgery. Check labs. Continue current medications. Stable cardiovascular status. Doing well. ECG with RBBB and SR. Stable CV status  EDEMA:  Resume lasix 20 mg MWF.

## 2022-07-22 ENCOUNTER — TELEPHONE (OUTPATIENT)
Dept: CARDIOLOGY CLINIC | Age: 82
End: 2022-07-22

## 2022-07-22 RX ORDER — SIMVASTATIN 40 MG
TABLET ORAL
Qty: 90 TABLET | Refills: 2 | Status: SHIPPED | OUTPATIENT
Start: 2022-07-22

## 2022-09-02 ENCOUNTER — OFFICE VISIT (OUTPATIENT)
Dept: CARDIOLOGY CLINIC | Age: 82
End: 2022-09-02
Payer: MEDICARE

## 2022-09-02 VITALS
SYSTOLIC BLOOD PRESSURE: 128 MMHG | DIASTOLIC BLOOD PRESSURE: 70 MMHG | BODY MASS INDEX: 28.28 KG/M2 | WEIGHT: 175.2 LBS | HEART RATE: 77 BPM

## 2022-09-02 DIAGNOSIS — I10 HTN (HYPERTENSION), BENIGN: ICD-10-CM

## 2022-09-02 DIAGNOSIS — I50.21 ACUTE SYSTOLIC CONGESTIVE HEART FAILURE (HCC): Primary | ICD-10-CM

## 2022-09-02 DIAGNOSIS — I25.2 OLD MYOCARDIAL INFARCTION: Primary | ICD-10-CM

## 2022-09-02 DIAGNOSIS — E78.2 MIXED HYPERLIPIDEMIA: ICD-10-CM

## 2022-09-02 DIAGNOSIS — I50.21 ACUTE SYSTOLIC CONGESTIVE HEART FAILURE (HCC): ICD-10-CM

## 2022-09-02 PROCEDURE — 1090F PRES/ABSN URINE INCON ASSESS: CPT | Performed by: INTERNAL MEDICINE

## 2022-09-02 PROCEDURE — G8427 DOCREV CUR MEDS BY ELIG CLIN: HCPCS | Performed by: INTERNAL MEDICINE

## 2022-09-02 PROCEDURE — 1123F ACP DISCUSS/DSCN MKR DOCD: CPT | Performed by: INTERNAL MEDICINE

## 2022-09-02 PROCEDURE — G8399 PT W/DXA RESULTS DOCUMENT: HCPCS | Performed by: INTERNAL MEDICINE

## 2022-09-02 PROCEDURE — G8417 CALC BMI ABV UP PARAM F/U: HCPCS | Performed by: INTERNAL MEDICINE

## 2022-09-02 PROCEDURE — 99214 OFFICE O/P EST MOD 30 MIN: CPT | Performed by: INTERNAL MEDICINE

## 2022-09-02 PROCEDURE — 1036F TOBACCO NON-USER: CPT | Performed by: INTERNAL MEDICINE

## 2022-09-02 RX ORDER — METHOCARBAMOL 500 MG/1
500 TABLET, FILM COATED ORAL 3 TIMES DAILY
COMMUNITY

## 2022-09-02 NOTE — PROGRESS NOTES
Subjective:          Subjective:      Patient ID: Silvestre Gandhi is a 79 y.o. female. CC: cad      HPI:  Patien has pna and sepsis. Lactate was 7.4 in beginning in March 2017 and was in hospital in [place] and then [facility]. Off eliquis for LLE DVT  Finished IV ATB for spine infection and finished po ATB. She has sinus pressure. No chest pain at all. Echo LVEF 25% and MPI 48% with no ischemia but apical infarct from 2000 MI and stent in mid LAD then. No chest pain. LVEF improved on recent 6/17 echo and LVEF 45-50%. No chest pressure nor orthopnea. Left knee swollen but no knee pain nor back pain. Still Goes to [facility] for water aerobics 2 times a week. She received a stair lift. No Known Allergies     Social History     Socioeconomic History    Marital status:      Spouse name: Not on file    Number of children: Not on file    Years of education: Not on file    Highest education level: Not on file   Occupational History    Not on file   Tobacco Use    Smoking status: Never    Smokeless tobacco: Never   Substance and Sexual Activity    Alcohol use: No    Drug use: No    Sexual activity: Not on file     Comment: . Other Topics Concern    Not on file   Social History Narrative    Not on file     Social Determinants of Health     Financial Resource Strain: Not on file   Food Insecurity: Not on file   Transportation Needs: Not on file   Physical Activity: Not on file   Stress: Not on file   Social Connections: Not on file   Intimate Partner Violence: Not on file   Housing Stability: Not on file        Patient has a family history includes Heart Disease in an other family member. Patient  has a past medical history of CAD (coronary artery disease), Cough, Hyperlipidemia, and Hypertension.      Current Outpatient Medications   Medication Sig Dispense Refill    methocarbamol (ROBAXIN) 500 MG tablet Take 500 mg by mouth 3 times daily      simvastatin (ZOCOR) 40 MG tablet TAKE 1 TABLET EVERY NIGHT 90 tablet 2    furosemide (LASIX) 20 MG tablet Take 1 tablet by mouth See Admin Instructions Take 1 tablet Monday Wednesday and Friday. 90 tablet 3    lisinopril (PRINIVIL;ZESTRIL) 2.5 MG tablet TAKE 1 TABLET EVERY DAY 90 tablet 3    carvedilol (COREG) 3.125 MG tablet TAKE 1 TABLET TWICE DAILY  WITH  MEALS 180 tablet 3    spironolactone (ALDACTONE) 25 MG tablet TAKE 1 TABLET EVERY DAY 90 tablet 3    famotidine (PEPCID) 20 MG tablet TAKE 1 TABLET TWICE DAILY 180 tablet 3    gabapentin (NEURONTIN) 300 MG capsule Take 300 mg by mouth 2 times daily. DULoxetine (CYMBALTA) 20 MG extended release capsule Take 20 mg by mouth daily      lidocaine (LIDODERM) 5 % Place 1 patch onto the skin daily 12 hours on, 12 hours off. 30 patch 0    aspirin 81 MG tablet Take 81 mg by mouth 2 times daily       nitroGLYCERIN (NITROSTAT) 0.4 MG SL tablet Place 1 tablet under the tongue every 5 minutes as needed 25 tablet 3    Handicap Placard MISC Duration 5 years  Dx: Coronoary Artery Disease, Old myocardial infarction 1 each 0     No current facility-administered medications for this visit. Vitals  Weight: 175 lb 3.2 oz (79.5 kg)164  Blood Pressure: BP: (128)/(70)  124/66  Pulse: 77 68      Review of Systems   Constitutional: Negative. HENT: Negative. Eyes: Negative. Respiratory: Negative. Cardiovascular: Negative. Gastrointestinal: Negative. Exam unchanged from 2/12/21. Objective:   Physical Exam   Nursing note and vitals reviewed. Constitutional: She is oriented to person, place, and time. She appears well-developed and well-nourished. HENT:   Head: Normocephalic and atraumatic. Eyes: Conjunctivae are normal. Pupils are equal, round, and reactive to light. Neck: Normal range of motion. Neck supple. No JVD present. No tracheal deviation present. No thyromegaly present. Cardiovascular: Normal rate, regular rhythm, normal heart sounds and intact distal pulses.   Exam reveals no gallop and daily  Edema: continue furosemide PRN. Go to lasix prn  Breast cancer with lumpectomy chemo with neuropathy. Recent echo improved to near normal with LVEF 35-40% on 9/19 echo  She had seven chemo runs for breast CAncer and had surgery, and completed radiation. Had left tkr March 12, 2018 at [facility] and doing well. No heart issues with surgery. Check labs. Continue current medications. Stable cardiovascular status. Doing well. ECG with RBBB and SR.      EDEMA:  Resume lasix 20 mg MWF. Continue current medications. Stable cardiovascular status.

## 2022-09-29 RX ORDER — FAMOTIDINE 20 MG/1
TABLET, FILM COATED ORAL
Qty: 180 TABLET | Refills: 3 | Status: SHIPPED | OUTPATIENT
Start: 2022-09-29

## 2022-09-29 NOTE — TELEPHONE ENCOUNTER
Requested Prescriptions     Pending Prescriptions Disp Refills    famotidine (PEPCID) 20 MG tablet [Pharmacy Med Name: FAMOTIDINE 20 MG Tablet] 180 tablet 3     Sig: TAKE 1 TABLET TWICE DAILY              Last Office Visit: 8/20/2021     Next Office Visit: 01.09.2023        Last Labs: 08.23.2022 results from [facility] health

## 2022-10-03 ENCOUNTER — TELEPHONE (OUTPATIENT)
Dept: CARDIOLOGY CLINIC | Age: 82
End: 2022-10-03

## 2022-10-03 NOTE — TELEPHONE ENCOUNTER
Pt called for samples of Entresto 24/26.  She only has 2 left I do not see this on pts current med list. Please advise [phone]

## 2022-11-16 RX ORDER — CARVEDILOL 3.12 MG/1
TABLET ORAL
Qty: 180 TABLET | Refills: 3 | Status: SHIPPED | OUTPATIENT
Start: 2022-11-16

## 2022-11-16 NOTE — TELEPHONE ENCOUNTER
Requested Prescriptions     Pending Prescriptions Disp Refills    carvedilol (COREG) 3.125 MG tablet [Pharmacy Med Name: CARVEDILOL 3.125 MG Tablet] 180 tablet 3     Sig: TAKE 1 TABLET TWICE DAILY WITH MEALS            Last Office Visit: 8/20/2021     Next Office Visit:01.09.2023        Last Labs: 08.23.2023 results from [facility] health

## 2023-01-09 ENCOUNTER — OFFICE VISIT (OUTPATIENT)
Dept: CARDIOLOGY CLINIC | Age: 83
End: 2023-01-09
Payer: MEDICARE

## 2023-01-09 VITALS
HEART RATE: 78 BPM | WEIGHT: 167.4 LBS | BODY MASS INDEX: 27.02 KG/M2 | DIASTOLIC BLOOD PRESSURE: 62 MMHG | SYSTOLIC BLOOD PRESSURE: 110 MMHG

## 2023-01-09 DIAGNOSIS — I25.10 CORONARY ARTERY DISEASE INVOLVING NATIVE CORONARY ARTERY OF NATIVE HEART WITHOUT ANGINA PECTORIS: Primary | ICD-10-CM

## 2023-01-09 DIAGNOSIS — I10 HTN (HYPERTENSION), BENIGN: ICD-10-CM

## 2023-01-09 DIAGNOSIS — E78.5 HYPERLIPIDEMIA, UNSPECIFIED HYPERLIPIDEMIA TYPE: ICD-10-CM

## 2023-01-09 PROCEDURE — G8399 PT W/DXA RESULTS DOCUMENT: HCPCS | Performed by: INTERNAL MEDICINE

## 2023-01-09 PROCEDURE — 1036F TOBACCO NON-USER: CPT | Performed by: INTERNAL MEDICINE

## 2023-01-09 PROCEDURE — 3074F SYST BP LT 130 MM HG: CPT | Performed by: INTERNAL MEDICINE

## 2023-01-09 PROCEDURE — G8417 CALC BMI ABV UP PARAM F/U: HCPCS | Performed by: INTERNAL MEDICINE

## 2023-01-09 PROCEDURE — 1090F PRES/ABSN URINE INCON ASSESS: CPT | Performed by: INTERNAL MEDICINE

## 2023-01-09 PROCEDURE — 1123F ACP DISCUSS/DSCN MKR DOCD: CPT | Performed by: INTERNAL MEDICINE

## 2023-01-09 PROCEDURE — G8427 DOCREV CUR MEDS BY ELIG CLIN: HCPCS | Performed by: INTERNAL MEDICINE

## 2023-01-09 PROCEDURE — 3078F DIAST BP <80 MM HG: CPT | Performed by: INTERNAL MEDICINE

## 2023-01-09 PROCEDURE — G8484 FLU IMMUNIZE NO ADMIN: HCPCS | Performed by: INTERNAL MEDICINE

## 2023-01-09 PROCEDURE — 99214 OFFICE O/P EST MOD 30 MIN: CPT | Performed by: INTERNAL MEDICINE

## 2023-01-09 NOTE — PROGRESS NOTES
Subjective:          Subjective:      Patient ID: Marco Lebron is a 79 y.o. female. CC: cad      HPI:  Patien has pna and sepsis. Lactate was 7.4 in beginning in March 2017 and was in hospital in [place] and then [facility]. Off eliquis for LLE DVT  Finished IV ATB for spine infection and finished po ATB. She has sinus pressure. No chest pain at all. Echo LVEF 25% and MPI 48% with no ischemia but apical infarct from 2000 MI and stent in mid LAD then. No chest pain. LVEF improved on recent 6/17 echo and LVEF 45-50%. No chest pressure nor orthopnea. Left knee swollen but no knee pain nor back pain. Still Goes to [facility] for water aerobics 2 times a week. She received a stair lift. No Known Allergies     Social History     Socioeconomic History    Marital status:      Spouse name: Not on file    Number of children: Not on file    Years of education: Not on file    Highest education level: Not on file   Occupational History    Not on file   Tobacco Use    Smoking status: Never    Smokeless tobacco: Never   Substance and Sexual Activity    Alcohol use: No    Drug use: No    Sexual activity: Not on file     Comment: . Other Topics Concern    Not on file   Social History Narrative    Not on file     Social Determinants of Health     Financial Resource Strain: Not on file   Food Insecurity: Not on file   Transportation Needs: Not on file   Physical Activity: Not on file   Stress: Not on file   Social Connections: Not on file   Intimate Partner Violence: Not on file   Housing Stability: Not on file        Patient has a family history includes Heart Disease in an other family member. Patient  has a past medical history of CAD (coronary artery disease), Cough, Hyperlipidemia, and Hypertension.      Current Outpatient Medications   Medication Sig Dispense Refill    carvedilol (COREG) 3.125 MG tablet TAKE 1 TABLET TWICE DAILY WITH MEALS 180 tablet 3    sacubitril-valsartan (ENTRESTO) 24-26 MG per tablet Take 1 tablet by mouth 2 times daily 60 tablet 3    famotidine (PEPCID) 20 MG tablet TAKE 1 TABLET TWICE DAILY 180 tablet 3    methocarbamol (ROBAXIN) 500 MG tablet Take 500 mg by mouth 3 times daily      simvastatin (ZOCOR) 40 MG tablet TAKE 1 TABLET EVERY NIGHT 90 tablet 2    furosemide (LASIX) 20 MG tablet Take 1 tablet by mouth See Admin Instructions Take 1 tablet Monday Wednesday and Friday. 90 tablet 3    spironolactone (ALDACTONE) 25 MG tablet TAKE 1 TABLET EVERY DAY 90 tablet 3    gabapentin (NEURONTIN) 300 MG capsule Take 300 mg by mouth 2 times daily. DULoxetine (CYMBALTA) 20 MG extended release capsule Take 20 mg by mouth daily      lidocaine (LIDODERM) 5 % Place 1 patch onto the skin daily 12 hours on, 12 hours off. 30 patch 0    aspirin 81 MG tablet Take 81 mg by mouth 2 times daily       nitroGLYCERIN (NITROSTAT) 0.4 MG SL tablet Place 1 tablet under the tongue every 5 minutes as needed 25 tablet 3    Handicap Placard MISC Duration 5 years  Dx: Coronoary Artery Disease, Old myocardial infarction 1 each 0     No current facility-administered medications for this visit. Vitals  Weight: 167 lb 6.4 oz (75.9 kg)164  Blood Pressure: BP: (110)/(62)  124/66  Pulse: 78 68      Review of Systems   Constitutional: Negative. HENT: Negative. Eyes: Negative. Respiratory: Negative. Cardiovascular: Negative. Gastrointestinal: Negative. Exam unchanged from 9/2/22. Objective:   Physical Exam   Nursing note and vitals reviewed. Constitutional: She is oriented to person, place, and time. She appears well-developed and well-nourished. HENT:   Head: Normocephalic and atraumatic. Eyes: Conjunctivae are normal. Pupils are equal, round, and reactive to light. Neck: Normal range of motion. Neck supple. No JVD present. No tracheal deviation present. No thyromegaly present. Cardiovascular: Normal rate, regular rhythm, normal heart sounds and intact distal pulses.   Exam reveals no gallop and no friction rub. No murmur heard. Pulmonary/Chest: Effort normal. No stridor. No respiratory distress. She has no wheezes. She has no rales. She exhibits no tenderness. Abdominal: Soft. Bowel sounds are normal. She exhibits no distension and no mass. No tenderness. She has no rebound and no guarding. Musculoskeletal: She exhibits no edema and no tenderness. Lymphadenopathy:     She has no cervical adenopathy. Neurological: She is alert and oriented to person, place, and time. No cranial nerve deficit. Coordination normal.   Skin: Skin is warm and dry. No rash noted. No erythema. No pallor. Psychiatric: She has a normal mood and affect. Her behavior is normal. Judgment and thought content normal.       Assessment:      Patient Active Problem List   Diagnosis    Sarcoidosis    Mixed hyperlipidemia    Immune thrombocytopenic purpura ([facility]    Old myocardial infarction    Coronary atherosclerosis of native coronary artery    Sprain of wrist    Chronic ITP (idiopathic thrombocytopenia) ([facility])    History of splenectomy    CAP (community acquired pneumonia)    Sepsis ([facility]    HTN (hypertension)    GERD (gastroesophageal reflux disease)    Lactic acidosis    Streptococcal sepsis ([facility])    TIKI (acute kidney injury) ([facility]    Acute systolic congestive heart failure ([facility])    Shock ([facility]    Pneumonia due to Streptococcus pneumoniae ([facility]    Bacteremia due to Streptococcus pneumoniae    Trigger finger, acquired      Diagnosis Orders   1. Coronary artery disease involving native coronary artery of native heart without angina pectoris        2. HTN (hypertension), benign        3. Hyperlipidemia, unspecified hyperlipidemia type                    Plan:      CAD:  Stable after remote stenting. Continue coreg and baby asa. No SL NTG use. Mild ischemic CMP:  Stable. NO orthopnea nor left heart failure symptoms  stop lisinopril for 3 days and try entresto 24/26 bid.   HTN:  Controlled with coreg and this is to continue. HLP:  Controlled and continue simvastatin 40 daily  Edema: continue furosemide PRN. Go to lasix prn  Breast cancer with lumpectomy chemo with neuropathy. Recent echo improved to near normal with LVEF 35-40% on 9/19 echo  She had seven chemo runs for breast CAncer and had surgery, and completed radiation. Had left tkr March 12, 2018 at [facility] and doing well. No heart issues with surgery. Check labs. Continue current medications. Stable cardiovascular status. Doing well. ECG with RBBB and SR.      EDEMA:  Resume lasix 20 mg MWF. Continue current medications. Stable cardiovascular status.

## 2023-01-18 RX ORDER — SPIRONOLACTONE 25 MG/1
TABLET ORAL
Qty: 90 TABLET | Refills: 3 | Status: SHIPPED | OUTPATIENT
Start: 2023-01-18

## 2023-01-18 NOTE — TELEPHONE ENCOUNTER
Requested Prescriptions     Pending Prescriptions Disp Refills    spironolactone (ALDACTONE) 25 MG tablet [Pharmacy Med Name: SPIRONOLACTONE 25 MG Tablet] 90 tablet 3     Sig: TAKE 1 TABLET EVERY DAY              Last Office Visit: 1/9/2023     Next Office Visit: 5/12/2023         Last Labs: 11.29.2022 UC results

## 2023-01-23 ENCOUNTER — TELEPHONE (OUTPATIENT)
Dept: CARDIOLOGY CLINIC | Age: 83
End: 2023-01-23

## 2023-01-23 NOTE — TELEPHONE ENCOUNTER
[facility] Medication Refills:    sacubitril-valsartan (ENTRESTO) 24-26 MG per tablet [[id number]]     Order Details  Dose: 1 tablet Route: Oral Frequency: 2 TIMES DAILY   Dispense Quantity: 60 tablet Refills: 3          Sig: Take 1 tablet by mouth 2 times daily     Pharmacy    [facility] - Nicola Sanderson [phone] - F [phone]   [address]   Phone:  [phone]  Fax:  [phone]         Last Office Visit: 01/09/23    Next Office Visit: 05/12/23    Last Refill: 10/03/22    Last Labs: 09/02/22

## 2023-01-23 NOTE — TELEPHONE ENCOUNTER
Pt states that she is taking Entresto and its to much pt can afford it and would like to know if she can go back on Lisinopril please call [phone]

## 2023-03-13 RX ORDER — SIMVASTATIN 40 MG
TABLET ORAL
Qty: 90 TABLET | Refills: 3 | Status: SHIPPED | OUTPATIENT
Start: 2023-03-13

## 2023-05-11 RX ORDER — FUROSEMIDE 20 MG/1
TABLET ORAL
Qty: 90 TABLET | Refills: 3 | Status: SHIPPED | OUTPATIENT
Start: 2023-05-11 | End: 2023-05-12 | Stop reason: SDUPTHER

## 2023-05-11 NOTE — TELEPHONE ENCOUNTER
Requested Prescriptions     Pending Prescriptions Disp Refills    furosemide (LASIX) 20 MG tablet [Pharmacy Med Name: FUROSEMIDE 20 MG Tablet] 90 tablet 3     Sig: TAKE 1 TABLET ON [address] Office Visit: 1/9/2023     Next Office Visit: 5/12/2023       Last Labs: 11.29.2022  health results

## 2023-05-12 ENCOUNTER — OFFICE VISIT (OUTPATIENT)
Dept: CARDIOLOGY CLINIC | Age: 83
End: 2023-05-12
Payer: MEDICARE

## 2023-05-12 VITALS
WEIGHT: 171.2 LBS | BODY MASS INDEX: 27.63 KG/M2 | DIASTOLIC BLOOD PRESSURE: 66 MMHG | SYSTOLIC BLOOD PRESSURE: 118 MMHG | HEART RATE: 58 BPM

## 2023-05-12 DIAGNOSIS — I10 PRIMARY HYPERTENSION: ICD-10-CM

## 2023-05-12 DIAGNOSIS — I25.10 CORONARY ARTERY DISEASE INVOLVING NATIVE CORONARY ARTERY OF NATIVE HEART WITHOUT ANGINA PECTORIS: ICD-10-CM

## 2023-05-12 DIAGNOSIS — I25.2 OLD MYOCARDIAL INFARCTION: Primary | ICD-10-CM

## 2023-05-12 DIAGNOSIS — E78.2 MIXED HYPERLIPIDEMIA: ICD-10-CM

## 2023-05-12 PROCEDURE — 1123F ACP DISCUSS/DSCN MKR DOCD: CPT | Performed by: INTERNAL MEDICINE

## 2023-05-12 PROCEDURE — G8399 PT W/DXA RESULTS DOCUMENT: HCPCS | Performed by: INTERNAL MEDICINE

## 2023-05-12 PROCEDURE — G8427 DOCREV CUR MEDS BY ELIG CLIN: HCPCS | Performed by: INTERNAL MEDICINE

## 2023-05-12 PROCEDURE — 93000 ELECTROCARDIOGRAM COMPLETE: CPT | Performed by: INTERNAL MEDICINE

## 2023-05-12 PROCEDURE — 3078F DIAST BP <80 MM HG: CPT | Performed by: INTERNAL MEDICINE

## 2023-05-12 PROCEDURE — 3074F SYST BP LT 130 MM HG: CPT | Performed by: INTERNAL MEDICINE

## 2023-05-12 PROCEDURE — G8417 CALC BMI ABV UP PARAM F/U: HCPCS | Performed by: INTERNAL MEDICINE

## 2023-05-12 PROCEDURE — 99214 OFFICE O/P EST MOD 30 MIN: CPT | Performed by: INTERNAL MEDICINE

## 2023-05-12 PROCEDURE — 1090F PRES/ABSN URINE INCON ASSESS: CPT | Performed by: INTERNAL MEDICINE

## 2023-05-12 PROCEDURE — 1036F TOBACCO NON-USER: CPT | Performed by: INTERNAL MEDICINE

## 2023-05-12 RX ORDER — SPIRONOLACTONE 25 MG/1
25 TABLET ORAL DAILY
Qty: 90 TABLET | Refills: 3 | Status: SHIPPED | OUTPATIENT
Start: 2023-05-12

## 2023-05-12 RX ORDER — FUROSEMIDE 20 MG/1
TABLET ORAL
Qty: 90 TABLET | Refills: 3 | Status: SHIPPED | OUTPATIENT
Start: 2023-05-12

## 2023-05-12 RX ORDER — CARVEDILOL 3.12 MG/1
3.12 TABLET ORAL 2 TIMES DAILY WITH MEALS
Qty: 180 TABLET | Refills: 3 | Status: SHIPPED | OUTPATIENT
Start: 2023-05-12

## 2023-05-12 RX ORDER — NITROGLYCERIN 0.4 MG/1
0.4 TABLET SUBLINGUAL EVERY 5 MIN PRN
Qty: 25 TABLET | Refills: 3 | Status: SHIPPED | OUTPATIENT
Start: 2023-05-12

## 2023-05-12 NOTE — PROGRESS NOTES
Controlled with coreg and this is to continue. HLP:  Controlled and continue simvastatin 40 daily  Edema: continue furosemide PRN. Go to lasix prn  Breast cancer with lumpectomy chemo with neuropathy. Recent echo improved to near normal with LVEF 35-40% on 9/19 echo  She had seven chemo runs for breast CAncer and had surgery, and completed radiation. Had left tkr March 12, 2018 at [facility] and doing well. No heart issues with surgery. Check labs. Continue current medications. Stable cardiovascular status. Doing well. ECG with RBBB and SR.      EDEMA:  Renewed  lasix 20 mg MWF. Pt doing great from cardiac perspective on 5/12/23. Continue current medications. Stable cardiovascular status. Check labs and lipids.

## 2023-05-16 LAB
ALBUMIN SERPL-MCNC: 3.8 G/DL (ref 3.4–5)
ALBUMIN/GLOB SERPL: 1.3 {RATIO} (ref 1.1–2.2)
ALP SERPL-CCNC: 48 U/L (ref 40–129)
ALT SERPL-CCNC: 18 U/L (ref 10–40)
ANION GAP SERPL CALCULATED.3IONS-SCNC: 8 MMOL/L (ref 3–16)
AST SERPL-CCNC: 17 U/L (ref 15–37)
BILIRUB SERPL-MCNC: 0.4 MG/DL (ref 0–1)
BUN SERPL-MCNC: 20 MG/DL (ref 7–20)
CALCIUM SERPL-MCNC: 9 MG/DL (ref 8.3–10.6)
CHLORIDE SERPL-SCNC: 108 MMOL/L (ref 99–110)
CHOLEST SERPL-MCNC: 148 MG/DL (ref 0–199)
CO2 SERPL-SCNC: 27 MMOL/L (ref 21–32)
CREAT SERPL-MCNC: 0.9 MG/DL (ref 0.6–1.2)
GFR SERPLBLD CREATININE-BSD FMLA CKD-EPI: >60 ML/MIN/{1.73_M2}
GLUCOSE SERPL-MCNC: 99 MG/DL (ref 70–99)
HDLC SERPL-MCNC: 58 MG/DL (ref 40–60)
LDLC SERPL CALC-MCNC: 79 MG/DL
POTASSIUM SERPL-SCNC: 5.2 MMOL/L (ref 3.5–5.1)
PROT SERPL-MCNC: 6.7 G/DL (ref 6.4–8.2)
SODIUM SERPL-SCNC: 143 MMOL/L (ref 136–145)
TRIGL SERPL-MCNC: 56 MG/DL (ref 0–150)
VLDLC SERPL CALC-MCNC: 11 MG/DL

## 2023-10-12 NOTE — TELEPHONE ENCOUNTER
Requested Prescriptions     Pending Prescriptions Disp Refills    famotidine (PEPCID) 20 MG tablet [Pharmacy Med Name: FAMOTIDINE 20 MG Tablet] 180 tablet 3     Sig: TAKE 1 TABLET TWICE DAILY            Last Office Visit: 5/12/2023     Next Office Visit: 11/20/2023         Last Labs: [phone]

## 2023-10-13 RX ORDER — FAMOTIDINE 20 MG/1
TABLET, FILM COATED ORAL
Qty: 180 TABLET | Refills: 3 | Status: SHIPPED | OUTPATIENT
Start: 2023-10-13

## 2023-11-09 ENCOUNTER — TELEPHONE (OUTPATIENT)
Dept: CARDIOLOGY CLINIC | Age: 83
End: 2023-11-09

## 2023-11-09 NOTE — TELEPHONE ENCOUNTER
Pt called stating that she was returning a call from this office but did not see an indication of one being placed to her. Please recall as necessary.     [phone]

## 2023-11-20 ENCOUNTER — OFFICE VISIT (OUTPATIENT)
Dept: CARDIOLOGY CLINIC | Age: 83
End: 2023-11-20
Payer: MEDICARE

## 2023-11-20 VITALS
SYSTOLIC BLOOD PRESSURE: 118 MMHG | BODY MASS INDEX: 27.18 KG/M2 | HEART RATE: 76 BPM | WEIGHT: 168.4 LBS | DIASTOLIC BLOOD PRESSURE: 66 MMHG

## 2023-11-20 DIAGNOSIS — I25.10 CORONARY ARTERY DISEASE INVOLVING NATIVE CORONARY ARTERY OF NATIVE HEART WITHOUT ANGINA PECTORIS: ICD-10-CM

## 2023-11-20 DIAGNOSIS — I10 HTN (HYPERTENSION), BENIGN: ICD-10-CM

## 2023-11-20 DIAGNOSIS — E78.5 HYPERLIPIDEMIA, UNSPECIFIED HYPERLIPIDEMIA TYPE: Primary | ICD-10-CM

## 2023-11-20 PROCEDURE — 1090F PRES/ABSN URINE INCON ASSESS: CPT | Performed by: INTERNAL MEDICINE

## 2023-11-20 PROCEDURE — 3078F DIAST BP <80 MM HG: CPT | Performed by: INTERNAL MEDICINE

## 2023-11-20 PROCEDURE — G8427 DOCREV CUR MEDS BY ELIG CLIN: HCPCS | Performed by: INTERNAL MEDICINE

## 2023-11-20 PROCEDURE — 99214 OFFICE O/P EST MOD 30 MIN: CPT | Performed by: INTERNAL MEDICINE

## 2023-11-20 PROCEDURE — G8399 PT W/DXA RESULTS DOCUMENT: HCPCS | Performed by: INTERNAL MEDICINE

## 2023-11-20 PROCEDURE — G8417 CALC BMI ABV UP PARAM F/U: HCPCS | Performed by: INTERNAL MEDICINE

## 2023-11-20 PROCEDURE — 1123F ACP DISCUSS/DSCN MKR DOCD: CPT | Performed by: INTERNAL MEDICINE

## 2023-11-20 PROCEDURE — G8484 FLU IMMUNIZE NO ADMIN: HCPCS | Performed by: INTERNAL MEDICINE

## 2023-11-20 PROCEDURE — 1036F TOBACCO NON-USER: CPT | Performed by: INTERNAL MEDICINE

## 2023-11-20 PROCEDURE — 3074F SYST BP LT 130 MM HG: CPT | Performed by: INTERNAL MEDICINE

## 2023-11-20 NOTE — PROGRESS NOTES
Subjective:          Subjective:      Patient ID: Ilia Olivo is a 79 y.o. female. CC: cad      HPI:  Patien has pna and sepsis. Lactate was 7.4 in beginning in March 2017 and was in hospital in [place] and then [facility]. Off eliquis for LLE DVT  Finished IV ATB for spine infection and finished po ATB. She has sinus pressure. No chest pain at all. Echo LVEF 25% and MPI 48% with no ischemia but apical infarct from 2000 MI and stent in mid LAD then. No chest pain. LVEF improved on recent 6/17 echo and LVEF 45-50%. No chest pressure nor orthopnea. Left knee swollen but no knee pain nor back pain. Still Goes to [facility] for water aerobics 2 times a week. She received a stair lift. Interval history 5/12/23:  no cp no palps no orthopnea. No presyncope. Doing well   11/20/23:  doing well and no chest pain. No Known Allergies     Social History     Socioeconomic History    Marital status:      Spouse name: Not on file    Number of children: Not on file    Years of education: Not on file    Highest education level: Not on file   Occupational History    Not on file   Tobacco Use    Smoking status: Never    Smokeless tobacco: Never   Substance and Sexual Activity    Alcohol use: No    Drug use: No    Sexual activity: Not on file     Comment: . Other Topics Concern    Not on file   Social History Narrative    Not on file     Social Determinants of Health     Financial Resource Strain: Not on file   Food Insecurity: Not on file   Transportation Needs: Not on file   Physical Activity: Not on file   Stress: Not on file   Social Connections: Not on file   Intimate Partner Violence: Not on file   Housing Stability: Not on file        Patient has a family history includes Heart Disease in an other family member. Patient  has a past medical history of CAD (coronary artery disease), Cough, Hyperlipidemia, and Hypertension.      Current Outpatient Medications   Medication Sig Dispense Refill

## 2024-01-22 NOTE — TELEPHONE ENCOUNTER
Requested Prescriptions     Pending Prescriptions Disp Refills    sacubitril-valsartan (ENTRESTO) 24-26 MG per tablet 180 tablet 3     Sig: Take 1 tablet by mouth 2 times daily    simvastatin (ZOCOR) 40 MG tablet 90 tablet 3     Sig: Take 1 tablet by mouth nightly    furosemide (LASIX) 20 MG tablet 90 tablet 3     Sig: TAKE 1 TABLET ON MONDAY, WEDNESDAY AND FRIDAY    carvedilol (COREG) 3.125 MG tablet 180 tablet 3     Sig: Take 1 tablet by mouth 2 times daily (with meals)       Last Office Visit: 11/20/2023     Next Office Visit: 5/29/2024

## 2024-01-23 RX ORDER — FUROSEMIDE 20 MG/1
TABLET ORAL
Qty: 90 TABLET | Refills: 3 | Status: SHIPPED | OUTPATIENT
Start: 2024-01-23

## 2024-01-23 RX ORDER — CARVEDILOL 3.12 MG/1
3.12 TABLET ORAL 2 TIMES DAILY WITH MEALS
Qty: 180 TABLET | Refills: 3 | Status: SHIPPED | OUTPATIENT
Start: 2024-01-23

## 2024-01-23 RX ORDER — SIMVASTATIN 40 MG
40 TABLET ORAL NIGHTLY
Qty: 90 TABLET | Refills: 3 | Status: SHIPPED | OUTPATIENT
Start: 2024-01-23

## 2024-02-05 NOTE — TELEPHONE ENCOUNTER
Requested Prescriptions     Pending Prescriptions Disp Refills    sacubitril-valsartan (ENTRESTO) 24-26 MG per tablet 180 tablet 3     Sig: Take 1 tablet by mouth 2 times daily              Last Office Visit: 11/20/2023     Next Office Visit: 5/29/2024         Last Labs: 08.29.2023 CMP,CBC,Diff from [facility]

## 2024-04-04 RX ORDER — SPIRONOLACTONE 25 MG/1
25 TABLET ORAL DAILY
Qty: 90 TABLET | Refills: 3 | Status: SHIPPED | OUTPATIENT
Start: 2024-04-04

## 2024-04-30 NOTE — TELEPHONE ENCOUNTER
Requested Prescriptions     Pending Prescriptions Disp Refills    sacubitril-valsartan (ENTRESTO) 24-26 MG per tablet 180 tablet 3     Sig: Take 1 tablet by mouth 2 times daily     Last Office Visit: 11/20/2023     Next Office Visit: 5/29/2024

## 2024-05-09 NOTE — TELEPHONE ENCOUNTER
Per last office note, pt is on Entresto 24/26 Tip: Hydropeel Tip, Teal Solution Override Number Of Passes Step 2: 1 Vacuum Pressure Low Setting (Will Not Render If Set To 0): 0 Location: face Solution: Antiox-6 Procedure: Extraction Vacuum Pressure Low Setting (Will Not Render If Set To 0): 16 Solution: Activ-4 Lymphatic Therapy: yes Tip Override Vacuum Pressure Low Setting (Will Not Render If Set To 0): 22 Consent: Written consent obtained, risks reviewed including but not limited to crusting, scabbing, blistering, scarring, darker or lighter pigmentary change, bruising, and/or incomplete response. Solution: Beta-HD Number Of Passes Step 3: 2 Tip: Hydropeel Tip, Purple Aggression Tip: Hydropeel Tip, Clear Solution Override: PT pregnant skipped GlySal Post-Care Instructions: I reviewed with the patient in detail post-care instructions. Patient should stay away from the sun and wear sun protection until treated areas are fully healed. Indication: skin texture Procedure: Exfoliation Procedure: Boost

## 2024-05-29 ENCOUNTER — OFFICE VISIT (OUTPATIENT)
Dept: CARDIOLOGY CLINIC | Age: 84
End: 2024-05-29
Payer: MEDICARE

## 2024-05-29 VITALS
WEIGHT: 157 LBS | SYSTOLIC BLOOD PRESSURE: 130 MMHG | DIASTOLIC BLOOD PRESSURE: 80 MMHG | BODY MASS INDEX: 25.34 KG/M2 | HEART RATE: 66 BPM

## 2024-05-29 DIAGNOSIS — I10 HTN (HYPERTENSION), BENIGN: ICD-10-CM

## 2024-05-29 DIAGNOSIS — E78.5 HYPERLIPIDEMIA, UNSPECIFIED HYPERLIPIDEMIA TYPE: Primary | ICD-10-CM

## 2024-05-29 PROCEDURE — 99214 OFFICE O/P EST MOD 30 MIN: CPT | Performed by: INTERNAL MEDICINE

## 2024-05-29 PROCEDURE — G8399 PT W/DXA RESULTS DOCUMENT: HCPCS | Performed by: INTERNAL MEDICINE

## 2024-05-29 PROCEDURE — 1090F PRES/ABSN URINE INCON ASSESS: CPT | Performed by: INTERNAL MEDICINE

## 2024-05-29 PROCEDURE — 1036F TOBACCO NON-USER: CPT | Performed by: INTERNAL MEDICINE

## 2024-05-29 PROCEDURE — 3079F DIAST BP 80-89 MM HG: CPT | Performed by: INTERNAL MEDICINE

## 2024-05-29 PROCEDURE — G8427 DOCREV CUR MEDS BY ELIG CLIN: HCPCS | Performed by: INTERNAL MEDICINE

## 2024-05-29 PROCEDURE — 1123F ACP DISCUSS/DSCN MKR DOCD: CPT | Performed by: INTERNAL MEDICINE

## 2024-05-29 PROCEDURE — G8417 CALC BMI ABV UP PARAM F/U: HCPCS | Performed by: INTERNAL MEDICINE

## 2024-05-29 PROCEDURE — 3075F SYST BP GE 130 - 139MM HG: CPT | Performed by: INTERNAL MEDICINE

## 2024-05-29 PROCEDURE — 93000 ELECTROCARDIOGRAM COMPLETE: CPT | Performed by: INTERNAL MEDICINE

## 2024-05-29 NOTE — PROGRESS NOTES
Current Outpatient Medications   Medication Sig Dispense Refill    sacubitril-valsartan (ENTRESTO) 24-26 MG per tablet Take 1 tablet by mouth 2 times daily 180 tablet 3    spironolactone (ALDACTONE) 25 MG tablet TAKE 1 TABLET EVERY DAY 90 tablet 3    simvastatin (ZOCOR) 40 MG tablet Take 1 tablet by mouth nightly 90 tablet 3    furosemide (LASIX) 20 MG tablet TAKE 1 TABLET ON MONDAY, WEDNESDAY AND FRIDAY 90 tablet 3    carvedilol (COREG) 3.125 MG tablet Take 1 tablet by mouth 2 times daily (with meals) 180 tablet 3    famotidine (PEPCID) 20 MG tablet TAKE 1 TABLET TWICE DAILY 180 tablet 3    nitroGLYCERIN (NITROSTAT) 0.4 MG SL tablet Place 1 tablet under the tongue every 5 minutes as needed for Chest pain 25 tablet 3    methocarbamol (ROBAXIN) 500 MG tablet Take 1 tablet by mouth 3 times daily      gabapentin (NEURONTIN) 300 MG capsule Take 1 capsule by mouth 2 times daily.      DULoxetine (CYMBALTA) 20 MG extended release capsule Take 1 capsule by mouth daily      lidocaine (LIDODERM) 5 % Place 1 patch onto the skin daily 12 hours on, 12 hours off. 30 patch 0    aspirin 81 MG tablet Take 1 tablet by mouth 2 times daily      Handicap Placard MISC Duration 5 years  Dx: Coronoary Artery Disease, Old myocardial infarction 1 each 0     No current facility-administered medications for this visit.        Vitals  Weight: 71.2 kg (157 lb)164  Blood Pressure: BP: (130)/(80)  124/66  Pulse: 66 68      Review of Systems   Constitutional: Negative.    HENT: Negative.    Eyes: Negative.    Respiratory: Negative.    Cardiovascular: Negative.    Gastrointestinal: Negative.      Exam unchanged from 9/2/22.  Objective:   Physical Exam   Nursing note and vitals reviewed.  Constitutional: She is oriented to person, place, and time. She appears well-developed and well-nourished.   HENT:   Head: Normocephalic and atraumatic.   Eyes: Conjunctivae are normal. Pupils are equal, round, and reactive to light.   Neck: Normal range of

## 2024-06-03 ENCOUNTER — HOSPITAL ENCOUNTER (INPATIENT)
Age: 84
LOS: 2 days | Discharge: HOME | DRG: 173 | End: 2024-06-05
Attending: STUDENT IN AN ORGANIZED HEALTH CARE EDUCATION/TRAINING PROGRAM | Admitting: HOSPITALIST
Payer: MEDICARE

## 2024-06-03 ENCOUNTER — APPOINTMENT (OUTPATIENT)
Dept: CT IMAGING | Age: 84
DRG: 173 | End: 2024-06-03
Payer: MEDICARE

## 2024-06-03 ENCOUNTER — APPOINTMENT (OUTPATIENT)
Dept: INTERVENTIONAL RADIOLOGY/VASCULAR | Age: 84
DRG: 173 | End: 2024-06-03
Attending: RADIOLOGY
Payer: MEDICARE

## 2024-06-03 ENCOUNTER — APPOINTMENT (OUTPATIENT)
Dept: GENERAL RADIOLOGY | Age: 84
DRG: 173 | End: 2024-06-03
Payer: MEDICARE

## 2024-06-03 DIAGNOSIS — I26.99 BILATERAL PULMONARY EMBOLISM (HCC): ICD-10-CM

## 2024-06-03 DIAGNOSIS — I26.09 OTHER ACUTE PULMONARY EMBOLISM WITH ACUTE COR PULMONALE (HCC): Primary | ICD-10-CM

## 2024-06-03 LAB
ALBUMIN SERPL-MCNC: 3.6 G/DL (ref 3.4–5)
ALBUMIN/GLOB SERPL: 1.1 {RATIO} (ref 1.1–2.2)
ALP SERPL-CCNC: 38 U/L (ref 40–129)
ALT SERPL-CCNC: 19 U/L (ref 10–40)
ANION GAP SERPL CALCULATED.3IONS-SCNC: 10 MMOL/L (ref 3–16)
AST SERPL-CCNC: 23 U/L (ref 15–37)
BASOPHILS # BLD: 0.1 K/UL (ref 0–0.2)
BASOPHILS NFR BLD: 0.9 %
BILIRUB SERPL-MCNC: 0.6 MG/DL (ref 0–1)
BUN SERPL-MCNC: 21 MG/DL (ref 7–20)
CALCIUM SERPL-MCNC: 8.8 MG/DL (ref 8.3–10.6)
CHLORIDE SERPL-SCNC: 103 MMOL/L (ref 99–110)
CO2 SERPL-SCNC: 23 MMOL/L (ref 21–32)
CREAT SERPL-MCNC: 1.1 MG/DL (ref 0.6–1.2)
DEPRECATED RDW RBC AUTO: 13.9 % (ref 12.4–15.4)
EKG ATRIAL RATE: 91 BPM
EKG DIAGNOSIS: NORMAL
EKG P AXIS: 63 DEGREES
EKG P-R INTERVAL: 166 MS
EKG Q-T INTERVAL: 392 MS
EKG QRS DURATION: 126 MS
EKG QTC CALCULATION (BAZETT): 482 MS
EKG R AXIS: 63 DEGREES
EKG T AXIS: 30 DEGREES
EKG VENTRICULAR RATE: 91 BPM
EOSINOPHIL # BLD: 0.2 K/UL (ref 0–0.6)
EOSINOPHIL NFR BLD: 2.4 %
GFR SERPLBLD CREATININE-BSD FMLA CKD-EPI: 49 ML/MIN/{1.73_M2}
GLUCOSE SERPL-MCNC: 165 MG/DL (ref 70–99)
HCT VFR BLD AUTO: 39.1 % (ref 36–48)
HGB BLD-MCNC: 13.1 G/DL (ref 12–16)
LIPASE SERPL-CCNC: 24 U/L (ref 13–60)
LYMPHOCYTES # BLD: 1.1 K/UL (ref 1–5.1)
LYMPHOCYTES NFR BLD: 17.2 %
MCH RBC QN AUTO: 33.2 PG (ref 26–34)
MCHC RBC AUTO-ENTMCNC: 33.4 G/DL (ref 31–36)
MCV RBC AUTO: 99.2 FL (ref 80–100)
MONOCYTES # BLD: 0.7 K/UL (ref 0–1.3)
MONOCYTES NFR BLD: 11.5 %
NEUTROPHILS # BLD: 4.4 K/UL (ref 1.7–7.7)
NEUTROPHILS NFR BLD: 68 %
NT-PROBNP SERPL-MCNC: 5218 PG/ML (ref 0–449)
PLATELET # BLD AUTO: 138 K/UL (ref 135–450)
PMV BLD AUTO: 8.6 FL (ref 5–10.5)
POTASSIUM SERPL-SCNC: 4.1 MMOL/L (ref 3.5–5.1)
PROT SERPL-MCNC: 6.8 G/DL (ref 6.4–8.2)
RBC # BLD AUTO: 3.94 M/UL (ref 4–5.2)
SODIUM SERPL-SCNC: 136 MMOL/L (ref 136–145)
TROPONIN, HIGH SENSITIVITY: 92 NG/L (ref 0–14)
TROPONIN, HIGH SENSITIVITY: 99 NG/L (ref 0–14)
WBC # BLD AUTO: 6.5 K/UL (ref 4–11)

## 2024-06-03 PROCEDURE — 71046 X-RAY EXAM CHEST 2 VIEWS: CPT

## 2024-06-03 PROCEDURE — 2500000003 HC RX 250 WO HCPCS: Performed by: RADIOLOGY

## 2024-06-03 PROCEDURE — 83880 ASSAY OF NATRIURETIC PEPTIDE: CPT

## 2024-06-03 PROCEDURE — 2000000000 HC ICU R&B

## 2024-06-03 PROCEDURE — C1887 CATHETER, GUIDING: HCPCS

## 2024-06-03 PROCEDURE — 36415 COLL VENOUS BLD VENIPUNCTURE: CPT

## 2024-06-03 PROCEDURE — 99285 EMERGENCY DEPT VISIT HI MDM: CPT

## 2024-06-03 PROCEDURE — C1769 GUIDE WIRE: HCPCS

## 2024-06-03 PROCEDURE — 6360000002 HC RX W HCPCS

## 2024-06-03 PROCEDURE — 80053 COMPREHEN METABOLIC PANEL: CPT

## 2024-06-03 PROCEDURE — C1894 INTRO/SHEATH, NON-LASER: HCPCS

## 2024-06-03 PROCEDURE — 85025 COMPLETE CBC W/AUTO DIFF WBC: CPT

## 2024-06-03 PROCEDURE — 96365 THER/PROPH/DIAG IV INF INIT: CPT

## 2024-06-03 PROCEDURE — 6360000004 HC RX CONTRAST MEDICATION

## 2024-06-03 PROCEDURE — 3E06317 INTRODUCTION OF OTHER THROMBOLYTIC INTO CENTRAL ARTERY, PERCUTANEOUS APPROACH: ICD-10-PCS | Performed by: RADIOLOGY

## 2024-06-03 PROCEDURE — 6360000002 HC RX W HCPCS: Performed by: RADIOLOGY

## 2024-06-03 PROCEDURE — 36014 PLACE CATHETER IN ARTERY: CPT

## 2024-06-03 PROCEDURE — 93005 ELECTROCARDIOGRAM TRACING: CPT | Performed by: STUDENT IN AN ORGANIZED HEALTH CARE EDUCATION/TRAINING PROGRAM

## 2024-06-03 PROCEDURE — 71260 CT THORAX DX C+: CPT

## 2024-06-03 PROCEDURE — 84484 ASSAY OF TROPONIN QUANT: CPT

## 2024-06-03 PROCEDURE — 02FR3Z0 FRAGMENTATION OF LEFT PULMONARY ARTERY, PERCUTANEOUS APPROACH, ULTRASONIC: ICD-10-PCS | Performed by: RADIOLOGY

## 2024-06-03 PROCEDURE — 83690 ASSAY OF LIPASE: CPT

## 2024-06-03 PROCEDURE — 2709999900 HC NON-CHARGEABLE SUPPLY

## 2024-06-03 RX ORDER — SODIUM CHLORIDE 9 MG/ML
INJECTION, SOLUTION INTRAVENOUS CONTINUOUS
Status: ACTIVE | OUTPATIENT
Start: 2024-06-03 | End: 2024-06-04

## 2024-06-03 RX ORDER — HEPARIN SODIUM 10000 [USP'U]/100ML
250 INJECTION, SOLUTION INTRAVENOUS CONTINUOUS
Status: ACTIVE | OUTPATIENT
Start: 2024-06-03 | End: 2024-06-04

## 2024-06-03 RX ORDER — DEXTROSE MONOHYDRATE 100 MG/ML
INJECTION, SOLUTION INTRAVENOUS CONTINUOUS PRN
Status: DISCONTINUED | OUTPATIENT
Start: 2024-06-03 | End: 2024-06-05 | Stop reason: HOSPADM

## 2024-06-03 RX ORDER — GABAPENTIN 300 MG/1
300 CAPSULE ORAL 2 TIMES DAILY
Status: DISCONTINUED | OUTPATIENT
Start: 2024-06-04 | End: 2024-06-05 | Stop reason: HOSPADM

## 2024-06-03 RX ORDER — ATORVASTATIN CALCIUM 40 MG/1
40 TABLET, FILM COATED ORAL DAILY
Status: DISCONTINUED | OUTPATIENT
Start: 2024-06-04 | End: 2024-06-05 | Stop reason: HOSPADM

## 2024-06-03 RX ORDER — METHOCARBAMOL 500 MG/1
500 TABLET, FILM COATED ORAL 3 TIMES DAILY
Status: DISCONTINUED | OUTPATIENT
Start: 2024-06-04 | End: 2024-06-05 | Stop reason: HOSPADM

## 2024-06-03 RX ORDER — GLUCAGON 1 MG/ML
1 KIT INJECTION PRN
Status: DISCONTINUED | OUTPATIENT
Start: 2024-06-03 | End: 2024-06-05 | Stop reason: HOSPADM

## 2024-06-03 RX ORDER — HEPARIN SODIUM 10000 [USP'U]/100ML
5-30 INJECTION, SOLUTION INTRAVENOUS CONTINUOUS
Status: DISCONTINUED | OUTPATIENT
Start: 2024-06-03 | End: 2024-06-05

## 2024-06-03 RX ORDER — ASPIRIN 81 MG/1
81 TABLET, CHEWABLE ORAL 2 TIMES DAILY
Status: DISCONTINUED | OUTPATIENT
Start: 2024-06-04 | End: 2024-06-05 | Stop reason: HOSPADM

## 2024-06-03 RX ORDER — HEPARIN SODIUM 1000 [USP'U]/ML
40 INJECTION, SOLUTION INTRAVENOUS; SUBCUTANEOUS PRN
Status: DISCONTINUED | OUTPATIENT
Start: 2024-06-03 | End: 2024-06-05

## 2024-06-03 RX ORDER — DULOXETIN HYDROCHLORIDE 20 MG/1
20 CAPSULE, DELAYED RELEASE ORAL DAILY
Status: DISCONTINUED | OUTPATIENT
Start: 2024-06-04 | End: 2024-06-05 | Stop reason: HOSPADM

## 2024-06-03 RX ORDER — CARVEDILOL 3.12 MG/1
3.12 TABLET ORAL 2 TIMES DAILY WITH MEALS
Status: DISCONTINUED | OUTPATIENT
Start: 2024-06-04 | End: 2024-06-05 | Stop reason: HOSPADM

## 2024-06-03 RX ORDER — FUROSEMIDE 20 MG/1
20 TABLET ORAL
Status: DISCONTINUED | OUTPATIENT
Start: 2024-06-05 | End: 2024-06-04

## 2024-06-03 RX ORDER — MIDAZOLAM HYDROCHLORIDE 1 MG/ML
INJECTION INTRAMUSCULAR; INTRAVENOUS PRN
Status: COMPLETED | OUTPATIENT
Start: 2024-06-03 | End: 2024-06-03

## 2024-06-03 RX ORDER — HEPARIN SODIUM 1000 [USP'U]/ML
INJECTION, SOLUTION INTRAVENOUS; SUBCUTANEOUS
Status: COMPLETED
Start: 2024-06-03 | End: 2024-06-03

## 2024-06-03 RX ORDER — LIDOCAINE HYDROCHLORIDE 10 MG/ML
INJECTION, SOLUTION EPIDURAL; INFILTRATION; INTRACAUDAL; PERINEURAL PRN
Status: COMPLETED | OUTPATIENT
Start: 2024-06-03 | End: 2024-06-03

## 2024-06-03 RX ORDER — HEPARIN SODIUM 1000 [USP'U]/ML
80 INJECTION, SOLUTION INTRAVENOUS; SUBCUTANEOUS PRN
Status: DISCONTINUED | OUTPATIENT
Start: 2024-06-03 | End: 2024-06-05

## 2024-06-03 RX ORDER — FAMOTIDINE 20 MG/1
20 TABLET, FILM COATED ORAL 2 TIMES DAILY
Status: DISCONTINUED | OUTPATIENT
Start: 2024-06-04 | End: 2024-06-05 | Stop reason: HOSPADM

## 2024-06-03 RX ORDER — SPIRONOLACTONE 25 MG/1
25 TABLET ORAL DAILY
Status: DISCONTINUED | OUTPATIENT
Start: 2024-06-04 | End: 2024-06-05 | Stop reason: HOSPADM

## 2024-06-03 RX ORDER — HEPARIN SODIUM 1000 [USP'U]/ML
80 INJECTION, SOLUTION INTRAVENOUS; SUBCUTANEOUS ONCE
Status: COMPLETED | OUTPATIENT
Start: 2024-06-03 | End: 2024-06-03

## 2024-06-03 RX ADMIN — IOPAMIDOL 75 ML: 755 INJECTION, SOLUTION INTRAVENOUS at 20:11

## 2024-06-03 RX ADMIN — HEPARIN SODIUM 5600 UNITS: 1000 INJECTION, SOLUTION INTRAVENOUS; SUBCUTANEOUS at 21:35

## 2024-06-03 RX ADMIN — FENTANYL CITRATE 50 MCG: 50 INJECTION, SOLUTION INTRAMUSCULAR; INTRAVENOUS at 22:25

## 2024-06-03 RX ADMIN — MIDAZOLAM HYDROCHLORIDE 1 MG: 2 INJECTION, SOLUTION INTRAMUSCULAR; INTRAVENOUS at 22:25

## 2024-06-03 RX ADMIN — LIDOCAINE HYDROCHLORIDE 10 ML: 10 INJECTION, SOLUTION EPIDURAL; INFILTRATION; INTRACAUDAL; PERINEURAL at 22:25

## 2024-06-03 RX ADMIN — HEPARIN SODIUM 5600 UNITS: 1000 INJECTION INTRAVENOUS; SUBCUTANEOUS at 21:35

## 2024-06-03 RX ADMIN — HEPARIN SODIUM 18 UNITS/KG/HR: 10000 INJECTION, SOLUTION INTRAVENOUS at 21:39

## 2024-06-03 ASSESSMENT — LIFESTYLE VARIABLES
HOW MANY STANDARD DRINKS CONTAINING ALCOHOL DO YOU HAVE ON A TYPICAL DAY: PATIENT DOES NOT DRINK
HOW OFTEN DO YOU HAVE A DRINK CONTAINING ALCOHOL: NEVER

## 2024-06-03 ASSESSMENT — PAIN SCALES - GENERAL: PAINLEVEL_OUTOF10: 0

## 2024-06-03 NOTE — ED PROVIDER NOTES
[facility]  EMERGENCY DEPARTMENT ENCOUNTER          EM RESIDENT NOTE       Date of evaluation: 6/3/2024    Chief Complaint     Chest Pain and Shortness of Breath (Pt coming from home for chest pain and sob with movement since yesterday. No O 2 at baseline but is at 86 % on RA. Denies other sx. )    History of Present Illness     Ousmane Walters is a 84 y.o. female w/ PMHx of CAD w/ prior MI in 2000, HFrEF (EF 35-40%), HTN and HLD who presents to the ED for evaluation of chest pain.  Patient reports that chest pain started yesterday and associated with a heaviness in her chest that is worse with exertion.  She also endorses redness of breath with exertion.  On arrival emergency department patient was found to have an oxygen saturation 86% on room air.  She denies any cough, runny nose, sore throat, fevers or chills.  Does report some exertion of her lower extremity edema in her left greater than right leg.  Denies any history of prior PE/DVT however per chart review patient was previously on Eliquis for left lower extremity DVT however is not on anticoagulation at this time.  She was last seen by her cardiologist on 5/29/2024 and at that time there was some left lower extremity swelling noted when compared to her right lower extremity.    Past Medical, Surgical, Family, and Social History     She has a past medical history of CAD (coronary artery disease), Cough, Hyperlipidemia, and Hypertension.  She has a past surgical history that includes joint replacement; Breast biopsy; bone marrow biopsy; Hysterectomy; Intracapsular cataract extraction; fine needle aspiration; knee surgery; and Tunneled venous port placement.  Her family history includes Heart Disease in an other family member.  She reports that she has never smoked. She has never used smokeless tobacco. She reports that she does not drink alcohol and does not use drugs.    Medications     Previous Medications    ASPIRIN 81 MG TABLET    Take 1 tablet

## 2024-06-03 NOTE — ED PROVIDER NOTES
ED Attending Attestation Note     Date of evaluation: 6/3/2024    This patient was seen by the resident.  I have seen and examined the patient, agree with the workup, evaluation, management and diagnosis. The care plan has been discussed.  I have reviewed the ECG and concur with the resident's interpretation.  My assessment reveals a woman with hx CAD who presents with chest pain, shortness of breath. On exam, she is awake, alert conversant. BLE are symmetric with 1+ pitting edema. Lungs CTAB. No murmur on cardiac acusculation. NOrmal rate, regular rhythm       Jose Elias Oliveira MD  06/03/24 1951

## 2024-06-04 LAB
ALBUMIN SERPL-MCNC: 3.3 G/DL (ref 3.4–5)
ANION GAP SERPL CALCULATED.3IONS-SCNC: 7 MMOL/L (ref 3–16)
ANTI-XA UNFRAC HEPARIN: 0.43 IU/ML (ref 0.3–0.7)
ANTI-XA UNFRAC HEPARIN: 0.68 IU/ML (ref 0.3–0.7)
ANTI-XA UNFRAC HEPARIN: 0.88 IU/ML (ref 0.3–0.7)
ANTI-XA UNFRAC HEPARIN: >1.1 IU/ML (ref 0.3–0.7)
APTT BLD: >180 SEC (ref 22.1–36.4)
BASOPHILS # BLD: 0 K/UL (ref 0–0.2)
BASOPHILS NFR BLD: 0.5 %
BUN SERPL-MCNC: 18 MG/DL (ref 7–20)
CALCIUM SERPL-MCNC: 8.4 MG/DL (ref 8.3–10.6)
CHLORIDE SERPL-SCNC: 107 MMOL/L (ref 99–110)
CO2 SERPL-SCNC: 26 MMOL/L (ref 21–32)
CREAT SERPL-MCNC: 0.9 MG/DL (ref 0.6–1.2)
DEPRECATED RDW RBC AUTO: 14.1 % (ref 12.4–15.4)
DEPRECATED RDW RBC AUTO: 14.2 % (ref 12.4–15.4)
EOSINOPHIL # BLD: 0.1 K/UL (ref 0–0.6)
EOSINOPHIL NFR BLD: 2.2 %
GFR SERPLBLD CREATININE-BSD FMLA CKD-EPI: 63 ML/MIN/{1.73_M2}
GLUCOSE SERPL-MCNC: 111 MG/DL (ref 70–99)
HCT VFR BLD AUTO: 36.8 % (ref 36–48)
HCT VFR BLD AUTO: 37.8 % (ref 36–48)
HCT VFR BLD AUTO: 43.8 % (ref 36–48)
HGB BLD-MCNC: 12.5 G/DL (ref 12–16)
HGB BLD-MCNC: 12.6 G/DL (ref 12–16)
HGB BLD-MCNC: 14.3 G/DL (ref 12–16)
INR PPP: 1.33 (ref 0.85–1.15)
LYMPHOCYTES # BLD: 1.1 K/UL (ref 1–5.1)
LYMPHOCYTES NFR BLD: 16.3 %
MAGNESIUM SERPL-MCNC: 2.8 MG/DL (ref 1.8–2.4)
MCH RBC QN AUTO: 32.6 PG (ref 26–34)
MCH RBC QN AUTO: 33 PG (ref 26–34)
MCHC RBC AUTO-ENTMCNC: 32.6 G/DL (ref 31–36)
MCHC RBC AUTO-ENTMCNC: 33.3 G/DL (ref 31–36)
MCV RBC AUTO: 100 FL (ref 80–100)
MCV RBC AUTO: 99.2 FL (ref 80–100)
MONOCYTES # BLD: 1 K/UL (ref 0–1.3)
MONOCYTES NFR BLD: 15.2 %
NEUTROPHILS # BLD: 4.3 K/UL (ref 1.7–7.7)
NEUTROPHILS NFR BLD: 65.8 %
PHOSPHATE SERPL-MCNC: 4.1 MG/DL (ref 2.5–4.9)
PLATELET # BLD AUTO: 122 K/UL (ref 135–450)
PLATELET # BLD AUTO: 128 K/UL (ref 135–450)
PMV BLD AUTO: 8.5 FL (ref 5–10.5)
PMV BLD AUTO: 9 FL (ref 5–10.5)
POTASSIUM SERPL-SCNC: 4.1 MMOL/L (ref 3.5–5.1)
PROTHROMBIN TIME: 16.7 SEC (ref 11.9–14.9)
RBC # BLD AUTO: 3.82 M/UL (ref 4–5.2)
RBC # BLD AUTO: 4.38 M/UL (ref 4–5.2)
SODIUM SERPL-SCNC: 140 MMOL/L (ref 136–145)
WBC # BLD AUTO: 6.5 K/UL (ref 4–11)
WBC # BLD AUTO: 6.6 K/UL (ref 4–11)

## 2024-06-04 PROCEDURE — 85018 HEMOGLOBIN: CPT

## 2024-06-04 PROCEDURE — 85730 THROMBOPLASTIN TIME PARTIAL: CPT

## 2024-06-04 PROCEDURE — 6360000002 HC RX W HCPCS

## 2024-06-04 PROCEDURE — 85027 COMPLETE CBC AUTOMATED: CPT

## 2024-06-04 PROCEDURE — 2060000000 HC ICU INTERMEDIATE R&B

## 2024-06-04 PROCEDURE — 36415 COLL VENOUS BLD VENIPUNCTURE: CPT

## 2024-06-04 PROCEDURE — 83735 ASSAY OF MAGNESIUM: CPT

## 2024-06-04 PROCEDURE — 85610 PROTHROMBIN TIME: CPT

## 2024-06-04 PROCEDURE — 2580000003 HC RX 258

## 2024-06-04 PROCEDURE — 6370000000 HC RX 637 (ALT 250 FOR IP)

## 2024-06-04 PROCEDURE — 85520 HEPARIN ASSAY: CPT

## 2024-06-04 PROCEDURE — 80069 RENAL FUNCTION PANEL: CPT

## 2024-06-04 PROCEDURE — 85014 HEMATOCRIT: CPT

## 2024-06-04 PROCEDURE — 85025 COMPLETE CBC W/AUTO DIFF WBC: CPT

## 2024-06-04 PROCEDURE — 99223 1ST HOSP IP/OBS HIGH 75: CPT | Performed by: INTERNAL MEDICINE

## 2024-06-04 RX ORDER — ONDANSETRON 4 MG/1
4 TABLET, ORALLY DISINTEGRATING ORAL EVERY 8 HOURS PRN
Status: DISCONTINUED | OUTPATIENT
Start: 2024-06-04 | End: 2024-06-05 | Stop reason: HOSPADM

## 2024-06-04 RX ORDER — OXYCODONE HYDROCHLORIDE 5 MG/1
5 TABLET ORAL EVERY 4 HOURS PRN
Status: DISCONTINUED | OUTPATIENT
Start: 2024-06-04 | End: 2024-06-04

## 2024-06-04 RX ORDER — SODIUM CHLORIDE 0.9 % (FLUSH) 0.9 %
5-40 SYRINGE (ML) INJECTION EVERY 12 HOURS SCHEDULED
Status: DISCONTINUED | OUTPATIENT
Start: 2024-06-04 | End: 2024-06-05 | Stop reason: HOSPADM

## 2024-06-04 RX ORDER — ONDANSETRON 2 MG/ML
4 INJECTION INTRAMUSCULAR; INTRAVENOUS EVERY 6 HOURS PRN
Status: DISCONTINUED | OUTPATIENT
Start: 2024-06-04 | End: 2024-06-05 | Stop reason: HOSPADM

## 2024-06-04 RX ORDER — ACETAMINOPHEN 650 MG/1
650 SUPPOSITORY RECTAL EVERY 6 HOURS PRN
Status: DISCONTINUED | OUTPATIENT
Start: 2024-06-04 | End: 2024-06-05 | Stop reason: HOSPADM

## 2024-06-04 RX ORDER — ACETAMINOPHEN 325 MG/1
650 TABLET ORAL EVERY 6 HOURS PRN
Status: DISCONTINUED | OUTPATIENT
Start: 2024-06-04 | End: 2024-06-05 | Stop reason: HOSPADM

## 2024-06-04 RX ORDER — FUROSEMIDE 20 MG/1
20 TABLET ORAL
Status: DISCONTINUED | OUTPATIENT
Start: 2024-06-05 | End: 2024-06-05 | Stop reason: HOSPADM

## 2024-06-04 RX ORDER — POLYETHYLENE GLYCOL 3350 17 G/17G
17 POWDER, FOR SOLUTION ORAL DAILY PRN
Status: DISCONTINUED | OUTPATIENT
Start: 2024-06-04 | End: 2024-06-05 | Stop reason: HOSPADM

## 2024-06-04 RX ORDER — OXYCODONE HYDROCHLORIDE 5 MG/1
10 TABLET ORAL EVERY 4 HOURS PRN
Status: DISCONTINUED | OUTPATIENT
Start: 2024-06-04 | End: 2024-06-04

## 2024-06-04 RX ORDER — SODIUM CHLORIDE 9 MG/ML
INJECTION, SOLUTION INTRAVENOUS PRN
Status: DISCONTINUED | OUTPATIENT
Start: 2024-06-04 | End: 2024-06-05 | Stop reason: HOSPADM

## 2024-06-04 RX ORDER — SODIUM CHLORIDE 0.9 % (FLUSH) 0.9 %
5-40 SYRINGE (ML) INJECTION PRN
Status: DISCONTINUED | OUTPATIENT
Start: 2024-06-04 | End: 2024-06-05 | Stop reason: HOSPADM

## 2024-06-04 RX ADMIN — METHOCARBAMOL TABLETS 500 MG: 500 TABLET, COATED ORAL at 21:56

## 2024-06-04 RX ADMIN — ATORVASTATIN CALCIUM 40 MG: 40 TABLET, FILM COATED ORAL at 09:22

## 2024-06-04 RX ADMIN — DULOXETINE HYDROCHLORIDE 20 MG: 20 CAPSULE, DELAYED RELEASE ORAL at 09:22

## 2024-06-04 RX ADMIN — METHOCARBAMOL TABLETS 500 MG: 500 TABLET, COATED ORAL at 14:11

## 2024-06-04 RX ADMIN — SACUBITRIL AND VALSARTAN 1 TABLET: 24; 26 TABLET, FILM COATED ORAL at 21:56

## 2024-06-04 RX ADMIN — FAMOTIDINE 20 MG: 20 TABLET, FILM COATED ORAL at 21:56

## 2024-06-04 RX ADMIN — HEPARIN SODIUM 18 UNITS/KG/HR: 10000 INJECTION, SOLUTION INTRAVENOUS at 07:10

## 2024-06-04 RX ADMIN — SPIRONOLACTONE 25 MG: 25 TABLET ORAL at 09:22

## 2024-06-04 RX ADMIN — CARVEDILOL 3.12 MG: 3.12 TABLET, FILM COATED ORAL at 09:22

## 2024-06-04 RX ADMIN — METHOCARBAMOL TABLETS 500 MG: 500 TABLET, COATED ORAL at 09:22

## 2024-06-04 RX ADMIN — CARVEDILOL 3.12 MG: 3.12 TABLET, FILM COATED ORAL at 17:59

## 2024-06-04 RX ADMIN — HYDROMORPHONE HYDROCHLORIDE 0.25 MG: 1 INJECTION, SOLUTION INTRAMUSCULAR; INTRAVENOUS; SUBCUTANEOUS at 05:46

## 2024-06-04 RX ADMIN — GABAPENTIN 300 MG: 300 CAPSULE ORAL at 09:22

## 2024-06-04 RX ADMIN — SODIUM CHLORIDE, PRESERVATIVE FREE 10 ML: 5 INJECTION INTRAVENOUS at 09:25

## 2024-06-04 RX ADMIN — FAMOTIDINE 20 MG: 20 TABLET, FILM COATED ORAL at 09:21

## 2024-06-04 RX ADMIN — SACUBITRIL AND VALSARTAN 1 TABLET: 24; 26 TABLET, FILM COATED ORAL at 09:22

## 2024-06-04 RX ADMIN — GABAPENTIN 300 MG: 300 CAPSULE ORAL at 21:56

## 2024-06-04 ASSESSMENT — PAIN SCALES - GENERAL
PAINLEVEL_OUTOF10: 5
PAINLEVEL_OUTOF10: 0

## 2024-06-04 ASSESSMENT — ENCOUNTER SYMPTOMS
WHEEZING: 0
CONSTIPATION: 0
SHORTNESS OF BREATH: 0
SHORTNESS OF BREATH: 1
CHEST TIGHTNESS: 1
CHEST TIGHTNESS: 0
COUGH: 0
VOMITING: 0
NAUSEA: 0
ABDOMINAL PAIN: 0
DIARRHEA: 0

## 2024-06-04 ASSESSMENT — PAIN DESCRIPTION - ORIENTATION: ORIENTATION: RIGHT

## 2024-06-04 ASSESSMENT — PAIN DESCRIPTION - LOCATION: LOCATION: NECK

## 2024-06-04 ASSESSMENT — PAIN DESCRIPTION - DESCRIPTORS: DESCRIPTORS: TENDER

## 2024-06-04 NOTE — H&P
tablet by mouth 2 times daily      Handicap Placard MISC Duration 5 years  Dx: Coronoary Artery Disease, Old myocardial infarction 1 each 0       Scheduled Meds:    [START ON 6/4/2024] spironolactone  25 mg Oral Daily    [START ON 6/4/2024] atorvastatin  40 mg Oral Daily    [START ON 6/4/2024] sacubitril-valsartan  1 tablet Oral BID    [START ON 6/4/2024] methocarbamol  500 mg Oral TID    [START ON 6/4/2024] gabapentin  300 mg Oral BID    [START ON 6/4/2024] famotidine  20 mg Oral BID    [START ON 6/4/2024] carvedilol  3.125 mg Oral BID WC    [START ON 6/4/2024] aspirin  81 mg Oral BID    [START ON 6/4/2024] DULoxetine  20 mg Oral Daily    [START ON 6/5/2024] furosemide  20 mg Oral Q MWF      Continuous Infusions:    heparin (PORCINE) Infusion Stopped (06/03/24 2220)    ALTEplase (CATHFLO) 10 mg in sodium chloride 0.9 % 250 mL infusion      ALTEplase (CATHFLO) 10 mg in sodium chloride 0.9 % 250 mL infusion      heparin (porcine)      heparin (porcine)      dextrose      sodium chloride      sodium chloride       PRN Meds: heparin (porcine), heparin (porcine), glucose, dextrose bolus **OR** dextrose bolus, glucagon (rDNA), dextrose    Allergies: No Known Allergies    REVIEW OF SYSTEMS:   History obtained from chart review and the patient    Review of Systems  As per HPI   PHYSICAL EXAM:   Vitals: /82   Pulse 81   Temp 98.3 °F (36.8 °C) (Oral)   Resp 22   Ht 1.676 m (5' 6\")   Wt 70.6 kg (155 lb 10.3 oz)   SpO2 100%   BMI 25.12 kg/m²     Physical Exam  Constitutional:       Appearance: Normal appearance.      Comments: Frail appearing elderly lady    Cardiovascular:      Rate and Rhythm: Normal rate and regular rhythm.      Pulses: Normal pulses.      Heart sounds: Normal heart sounds.   Pulmonary:      Effort: Pulmonary effort is normal.      Breath sounds: Normal breath sounds.   Abdominal:      General: Abdomen is flat. Bowel sounds are normal. There is no distension.      Palpations: Abdomen is soft.

## 2024-06-04 NOTE — PLAN OF CARE
Reached out to on-call surgery about options for EKOS site actively bleeding after removal of the sheaths. RN has been actively holding pressure as per IR's instructions for the past hour with minimal resolution of bleeding. Patient has been hemodynamically stable and asymptomatic throughout the course.  On-call surgery present at bedside notes a suture would not be effective and advises continual pressure at the site is the best approach.  Will continue to monitor closely.      Ronel Polanco MD  Internal Medicine PGY-1

## 2024-06-04 NOTE — MANAGEMENT PLAN
-[facility] Internal Medicine-  -ICU to [facility] Transfer Note-  HPI from H&P  \"Ousmane Walters is a 84 year old female with PMHx of RLE DVT (2017) CAD s/p stent w/ prior MI 2000, ischemic cardiomyopathy (EF 35-40%), HTN, HLD, Autoimmune thrombocytopenia s/p splenectomy, sarcoidosis, breast cancer s/p chemotherapy, partial mastectomy, peripheral neuropathy s/p chemotherapy who presents to the ED on 6/3/2024 for shortness of breath and chest discomfort for a two day duration. Patient stated yesterday she suddenly had shortness of breath while she was working around the house but was relieved with rest. She stated she had chest discomfort during this time. Denies any recent fevers, flu-like symptoms, cough, or sick like contacts.      I  ICU Admission Reason & Brief ICU Course:     CT Chest pulmonary embolism w/ contrast showed Extensive bilateral pulmonary emboli in the left main pulmonary artery and bilateral lobar pulmonary arteries. Suspected right heart strain with borderline elevated RV/LV ratio of 1.0. Small area of peripheral consolidation in the right upper lobe may represent a pulmonary infarct versus chronic scar.   Bedside echocardiogram per ED physician was consistent with right heart strain with a RV to LV ratio of 1.   Interventional radiology was consulted and recommended immediate intervention. Heparin was started and patient is to be admitted to the ICU for further management of bilateral pulmonary embolism.        C  Code Status/DPOA Info/Goals of Care/ACP Note:   -Code Status Full  -POA/ACP documentation?: no  -Any changes to goals of care? no  -is palliative care consulted? no      U Unprescribing & Pertinent High-Risk Medications  -Anticoagulation:  heparin    -Antibiotics:  N/A - no current planned antimicrobials   -[] GI PPX : no    P Pending Tests at the Time of Transfer    Echo pending    Procedures   none      A Active consultants, including Rehab:  IR       U Uncertainty 
MD Tanika Krishnan, DO  06/04/24

## 2024-06-04 NOTE — PRE SEDATION
Sedation Pre-Procedure Note    Patient Name: Ousmane Walters   YOB: 1940  Room/Bed: BRIAN/NONE  Medical Record Number: [id number]  Date: 6/3/2024   Time: 10:59 PM       Indication:  PE with right heart strain.    Consent: I have discussed with the patient and/or the patient representative the indication, alternatives, and the possible risks and/or complications of the planned procedure and the anesthesia methods. The patient and/or patient representative appear to understand and agree to proceed.    Vital Signs:   Vitals:    06/03/24 2200   BP: 131/82   Pulse: 81   Resp: 22   Temp:    SpO2: 100%       Past Medical History:   has a past medical history of Acute DVT of popliteal vein of right lower extremity, Coronary artery disease, Cough, Hyperlipidemia, Hypertension, and Myocardial infarction involving LAD coronary artery.    Past Surgical History:   has a past surgical history that includes joint replacement; Breast biopsy; bone marrow biopsy; Hysterectomy; Intracapsular cataract extraction; fine needle aspiration; knee surgery; and Tunneled venous port placement.    Medications:   Scheduled Meds:    [START ON 6/4/2024] spironolactone  25 mg Oral Daily    [START ON 6/4/2024] atorvastatin  40 mg Oral Daily    [START ON 6/4/2024] sacubitril-valsartan  1 tablet Oral BID    [START ON 6/4/2024] methocarbamol  500 mg Oral TID    [START ON 6/4/2024] gabapentin  300 mg Oral BID    [START ON 6/4/2024] famotidine  20 mg Oral BID    [START ON 6/4/2024] carvedilol  3.125 mg Oral BID WC    [START ON 6/4/2024] aspirin  81 mg Oral BID    [START ON 6/4/2024] DULoxetine  20 mg Oral Daily    [START ON 6/5/2024] furosemide  20 mg Oral Q MWF     Continuous Infusions:    heparin (PORCINE) Infusion Stopped (06/03/24 2220)    ALTEplase (CATHFLO) 10 mg in sodium chloride 0.9 % 250 mL infusion      ALTEplase (CATHFLO) 10 mg in sodium chloride 0.9 % 250 mL infusion      heparin (porcine)      heparin (porcine)      dextrose

## 2024-06-04 NOTE — PLAN OF CARE
Problem: Discharge Planning  Goal: Discharge to home or other facility with appropriate resources  Outcome: Progressing  Flowsheets (Taken 6/4/2024 0800)  Discharge to home or other facility with appropriate resources: Identify barriers to discharge with patient and caregiver     Problem: Pain  Goal: Verbalizes/displays adequate comfort level or baseline comfort level  Outcome: Progressing  Flowsheets (Taken 6/4/2024 0800)  Verbalizes/displays adequate comfort level or baseline comfort level: Encourage patient to monitor pain and request assistance     Problem: Safety - Adult  Goal: Free from fall injury  Outcome: Progressing  Flowsheets (Taken 6/4/2024 1030)  Free From Fall Injury: Instruct family/caregiver on patient safety     Problem: ABCDS Injury Assessment  Goal: Absence of physical injury  Outcome: Progressing

## 2024-06-04 NOTE — CARE COORDINATION
Case Management Assessment  Initial Evaluation    Date/Time of Evaluation: 6/4/2024 2:51 PM  Assessment Completed by: Lisandra Lan RN    If patient is discharged prior to next notation, then this note serves as note for discharge by case management.    Patient Name: Ousmane Walters                   YOB: 1940  Diagnosis: Bilateral pulmonary embolism (HCC) [I26.99]  Other acute pulmonary embolism with acute cor pulmonale (HCC) [I26.09]                   Date / Time: 6/3/2024  6:38 PM    Patient Admission Status: Inpatient   Readmission Risk (Low < 19, Mod (19-27), High > 27): Readmission Risk Score: 11.5    Current PCP: Jeff Magaña MD  PCP verified by CM? Yes    Chart Reviewed: Yes      History Provided by: Patient  Patient Orientation: Alert and Oriented, Person, Place, Situation    Patient Cognition: Alert    Hospitalization in the last 30 days (Readmission):  No    If yes, Readmission Assessment in CM Navigator will be completed.    Advance Directives:      Code Status: Full Code   Patient's Primary Decision Maker is: Legal Next of Kin      Discharge Planning:    Patient lives with:   Type of Home:    Primary Care Giver: Self  Patient Support Systems include: Children, Family Members   Current Financial resources:    Current community resources:    Current services prior to admission:              Current DME:              Type of Home Care services:       ADLS  Prior functional level: Independent in ADLs/IADLs  Current functional level: Independent in ADLs/IADLs    PT AM-PAC:   /24  OT AM-PAC:   /24    Family can provide assistance at DC: No  Would you like Case Management to discuss the discharge plan with any other family members/significant others, and if so, who? No  Plans to Return to Present Housing: Yes  Other Identified Issues/Barriers to RETURNING to current housing: sob  Potential Assistance needed at discharge:              Potential DME:    Patient expects to discharge to:    Plan

## 2024-06-04 NOTE — PLAN OF CARE
Nurse notified on call ICU team that patient's EKOS site appeared to be bleeding around 2:50 AM. Patient's tPA infusion was to be completed at 3:00 AM. Nurse removed the infusion and we examined the EKOS site. It appears the bleeding is around the sheath. On call IR physician was called and informed us to wait an hour then completely remove the sheath and examine the site for bleeding. He states there is to be some bleeding around the site after the procedure.     Patient is overall doing well. Vitals stable. Denies any complaints of pain from the site, shortness of breath, chest pain, lightheadedness or dizziness. Will remove the sheath at 4 AM.     Ronel Polanco MD   Internal Medicine PGY-1

## 2024-06-05 VITALS
OXYGEN SATURATION: 96 % | SYSTOLIC BLOOD PRESSURE: 134 MMHG | HEIGHT: 66 IN | DIASTOLIC BLOOD PRESSURE: 78 MMHG | RESPIRATION RATE: 19 BRPM | WEIGHT: 163.14 LBS | TEMPERATURE: 97.6 F | BODY MASS INDEX: 26.22 KG/M2 | HEART RATE: 79 BPM

## 2024-06-05 LAB
ALBUMIN SERPL-MCNC: 2.7 G/DL (ref 3.4–5)
ANION GAP SERPL CALCULATED.3IONS-SCNC: 6 MMOL/L (ref 3–16)
ANTI-XA UNFRAC HEPARIN: 0.86 IU/ML (ref 0.3–0.7)
ANTI-XA UNFRAC HEPARIN: 0.87 IU/ML (ref 0.3–0.7)
BASOPHILS # BLD: 0 K/UL (ref 0–0.2)
BASOPHILS NFR BLD: 0.6 %
BUN SERPL-MCNC: 15 MG/DL (ref 7–20)
CALCIUM SERPL-MCNC: 8.1 MG/DL (ref 8.3–10.6)
CHLORIDE SERPL-SCNC: 103 MMOL/L (ref 99–110)
CO2 SERPL-SCNC: 27 MMOL/L (ref 21–32)
CREAT SERPL-MCNC: 0.7 MG/DL (ref 0.6–1.2)
DEPRECATED RDW RBC AUTO: 13.7 % (ref 12.4–15.4)
EOSINOPHIL # BLD: 0.2 K/UL (ref 0–0.6)
EOSINOPHIL NFR BLD: 4.3 %
GFR SERPLBLD CREATININE-BSD FMLA CKD-EPI: 85 ML/MIN/{1.73_M2}
GLUCOSE SERPL-MCNC: 113 MG/DL (ref 70–99)
HCT VFR BLD AUTO: 31.3 % (ref 36–48)
HGB BLD-MCNC: 10.6 G/DL (ref 12–16)
LYMPHOCYTES # BLD: 1.2 K/UL (ref 1–5.1)
LYMPHOCYTES NFR BLD: 22.3 %
MAGNESIUM SERPL-MCNC: 2.1 MG/DL (ref 1.8–2.4)
MCH RBC QN AUTO: 33.4 PG (ref 26–34)
MCHC RBC AUTO-ENTMCNC: 33.7 G/DL (ref 31–36)
MCV RBC AUTO: 99 FL (ref 80–100)
MONOCYTES # BLD: 0.8 K/UL (ref 0–1.3)
MONOCYTES NFR BLD: 14.5 %
NEUTROPHILS # BLD: 3.2 K/UL (ref 1.7–7.7)
NEUTROPHILS NFR BLD: 58.3 %
PHOSPHATE SERPL-MCNC: 3.2 MG/DL (ref 2.5–4.9)
PLATELET # BLD AUTO: 119 K/UL (ref 135–450)
PMV BLD AUTO: 8.8 FL (ref 5–10.5)
POTASSIUM SERPL-SCNC: 3.9 MMOL/L (ref 3.5–5.1)
RBC # BLD AUTO: 3.16 M/UL (ref 4–5.2)
SODIUM SERPL-SCNC: 136 MMOL/L (ref 136–145)
SPECIMEN SOURCE: NORMAL
WBC # BLD AUTO: 5.5 K/UL (ref 4–11)

## 2024-06-05 PROCEDURE — 2580000003 HC RX 258

## 2024-06-05 PROCEDURE — 6370000000 HC RX 637 (ALT 250 FOR IP)

## 2024-06-05 PROCEDURE — 97530 THERAPEUTIC ACTIVITIES: CPT

## 2024-06-05 PROCEDURE — 85520 HEPARIN ASSAY: CPT

## 2024-06-05 PROCEDURE — 6360000002 HC RX W HCPCS

## 2024-06-05 PROCEDURE — 99232 SBSQ HOSP IP/OBS MODERATE 35: CPT | Performed by: INTERNAL MEDICINE

## 2024-06-05 PROCEDURE — 83735 ASSAY OF MAGNESIUM: CPT

## 2024-06-05 PROCEDURE — 97116 GAIT TRAINING THERAPY: CPT

## 2024-06-05 PROCEDURE — 97162 PT EVAL MOD COMPLEX 30 MIN: CPT

## 2024-06-05 PROCEDURE — 85025 COMPLETE CBC W/AUTO DIFF WBC: CPT

## 2024-06-05 PROCEDURE — 80069 RENAL FUNCTION PANEL: CPT

## 2024-06-05 PROCEDURE — 97166 OT EVAL MOD COMPLEX 45 MIN: CPT

## 2024-06-05 PROCEDURE — 81241 F5 GENE: CPT

## 2024-06-05 PROCEDURE — 81240 F2 GENE: CPT

## 2024-06-05 PROCEDURE — 36415 COLL VENOUS BLD VENIPUNCTURE: CPT

## 2024-06-05 RX ADMIN — FAMOTIDINE 20 MG: 20 TABLET, FILM COATED ORAL at 09:35

## 2024-06-05 RX ADMIN — SACUBITRIL AND VALSARTAN 1 TABLET: 24; 26 TABLET, FILM COATED ORAL at 09:35

## 2024-06-05 RX ADMIN — FUROSEMIDE 20 MG: 20 TABLET ORAL at 09:34

## 2024-06-05 RX ADMIN — APIXABAN 10 MG: 5 TABLET, FILM COATED ORAL at 10:10

## 2024-06-05 RX ADMIN — METHOCARBAMOL TABLETS 500 MG: 500 TABLET, COATED ORAL at 09:33

## 2024-06-05 RX ADMIN — ATORVASTATIN CALCIUM 40 MG: 40 TABLET, FILM COATED ORAL at 09:35

## 2024-06-05 RX ADMIN — CARVEDILOL 3.12 MG: 3.12 TABLET, FILM COATED ORAL at 09:34

## 2024-06-05 RX ADMIN — GABAPENTIN 300 MG: 300 CAPSULE ORAL at 09:35

## 2024-06-05 RX ADMIN — SODIUM CHLORIDE, PRESERVATIVE FREE 10 ML: 5 INJECTION INTRAVENOUS at 09:35

## 2024-06-05 RX ADMIN — SPIRONOLACTONE 25 MG: 25 TABLET ORAL at 09:34

## 2024-06-05 RX ADMIN — DULOXETINE HYDROCHLORIDE 20 MG: 20 CAPSULE, DELAYED RELEASE ORAL at 09:34

## 2024-06-05 RX ADMIN — HEPARIN SODIUM 14 UNITS/KG/HR: 10000 INJECTION, SOLUTION INTRAVENOUS at 04:44

## 2024-06-05 RX ADMIN — METHOCARBAMOL TABLETS 500 MG: 500 TABLET, COATED ORAL at 15:36

## 2024-06-05 ASSESSMENT — PAIN SCALES - GENERAL
PAINLEVEL_OUTOF10: 0
PAINLEVEL_OUTOF10: 0

## 2024-06-05 NOTE — DISCHARGE INSTRUCTIONS
Please start taking Eliquis 5 mg; take 2 tablets twice daily for the next 6 days, then take 1 tablet twice daily    Please follow-up with pulmonology    Please follow-up with Hematology    Please follow-up with your PCP

## 2024-06-05 NOTE — DISCHARGE SUMMARY
spironolactone 25 MG tablet  Commonly known as: ALDACTONE  TAKE 1 TABLET EVERY DAY            STOP taking these medications      aspirin 81 MG tablet               Where to Get Your Medications        These medications were sent to [id number] - [place], [place] - [address] - P [phone] - F [phone] [address] [facility] [address]      Phone: [phone]   apixaban 5 MG Tabs tablet       Activity: activity as tolerated  Diet: cardiac diet  Wound Care: keep wound clean and dry    Time Spent on discharge is more than 30 minutes    Signed:  Tanika Horn DO  Internal Medicine Resident, PGY-1  6/5/2024

## 2024-06-05 NOTE — CONSULTS
[facility]    Consult Note      Requesting Physician:  Dr. Robins    CHIEF COMPLAINT:  PE      HISTORY OF PRESENT ILLNESS:    Ms. Walters  is a 84 y.o. female we are seeing in consultation for hypercoagulable work up.   She is a 84 year old female with PMHx of RLE DVT (2017) CAD s/p stent w/ prior MI 2000, ischemic cardiomyopathy (EF 35-40%), HTN, HLD, ITP s/p splenectomy, sarcoidosis, TNBC breast cancer s/p chemotherapy, Right partial mastectomy and AXLND in 2019, peripheral neuropathy s/p chemotherapy who presents to the ED on 6/3/2024 for shortness of breath and chest discomfort for a two day duration. Patient stated yesterday she suddenly had shortness of breath while she was working around the house but was relieved with rest. She stated she had chest discomfort during this time. Denies any recent fevers, flu-like symptoms, cough, or sick like contacts.      ED course:  Upon arrival to the ED, vitals /69, temp 98.3, pulse 98, RR 16, and saturating on room air at 89%.   Labs remarkable for pro-BNP 5,218 and troponin 92.  EKG showed right bundle branch block similar to prior EKG.   CT Chest pulmonary embolism w/ contrast showed Extensive bilateral pulmonary emboli in the left main pulmonary artery and bilateral lobar pulmonary arteries. Suspected right heart strain with borderline elevated RV/LV ratio of 1.0. Small area of peripheral consolidation in the right upper lobe may represent a pulmonary infarct versus chronic scar.   Bedside echocardiogram per ED physician was consistent with right heart strain with a RV to LV ratio of 1.   Interventional radiology was consulted and recommended immediate intervention. Heparin was started and patient is to be admitted to the ICU for further management of bilateral pulmonary embolism.     Acute submassive b/l PE   Concerns for right heart 
IR consult complete, patient underwent successful EKOS catheter placement. Bleeding at access site/sheath now controlled. See procedure dictation for additional information.   
Please see progress note written at 5:25 AM on 06/04 by Kamla Calderon for full consult note.      Patient reassessed this AM with no further bleeding from EKOS procedure to R IJV site without hematoma. She is HDS and Hgb this AM is 12.5 from 12.6. Continue pressure dressing to R IJV site.     Please re-contact surgery if patient has further bleeding episodes.      Clarice Mcdonald DO, Semaj  PGY-2, General Surgery  06/04/24  8:49 AM  Lacy  
hours.  BNP:No results for input(s): \"BNP\" in the last 72 hours.  ABGs: No results for input(s): \"PHART\", \"LCQ6WME\", \"PO2ART\" in the last 72 hours.  INR:   Recent Labs     06/04/24  0025   INR 1.33*       U/A:No results for input(s): \"NITRITE\", \"COLORU\", \"PHUR\", \"LABCAST\", \"WBCUA\", \"RBCUA\", \"MUCUS\", \"TRICHOMONAS\", \"YEAST\", \"BACTERIA\", \"CLARITYU\", \"SPECGRAV\", \"LEUKOCYTESUR\", \"UROBILINOGEN\", \"BILIRUBINUR\", \"BLOODU\", \"GLUCOSEU\", \"AMORPHOUS\" in the last 72 hours.    Invalid input(s): \"KETONESU\"    IR ANGIOGRAM PULMONARY BILATERAL         CT CHEST PULMONARY EMBOLISM W CONTRAST   Final Result      1.  Extensive bilateral pulmonary emboli in the left main pulmonary artery and   bilateral lobar pulmonary arteries. Suspected right heart strain with borderline   elevated RV/LV ratio of 1.0.   2.  Small area of peripheral consolidation in the right upper lobe may represent   a pulmonary infarct versus chronic scar.      Preliminary findings were discussed with Dr. Oliveira.      XR CHEST (2 VW)   Final Result      No acute pulmonary disease.             EKG:   Echo:  Micro:     ASSESSMENT AND PLAN:   Ousmane Walters is a 84 y.o. female, who was admitted for a day of chest pain and shortness of breath. Chest pain relieved with rest and worsens with activity.    Acute submassive b/l PE   Concerns for right heart strain  Patient presented with 2 day duration of exertional chest pain and SOB. Worsens with activity, gets better with rest. In the ED, patient had an increased O2 requirement of 2L, baseline no O2 requirements. CTPE showed extensive b/l PE in the left main and segmental lobar regions with concerns for right heart strain. Bedside ECHO was done in the ED showed right heart strain with RV:LV of 1. EKG showed no changes from prior EKG. Troponin 86. Pro BNP 5200. Hx of DVT in 2017 after [facility]NA admission and required eliquis from a year. PESI score 155, Class V  - IR recommended immediate intervention   - s/p b/l pulm arterial EKOS

## 2024-06-05 NOTE — PROGRESS NOTES
Pulmonary Followup Note    Indication for visit: Submassive pulmonary embolus    Subjective:    Ousmane has been transition from heparin drip to Eliquis.  She still has some oozing from her right IJ site of EKOS but no neck pain, difficulty swallowing, chest pain, or dyspnea  Oxygen saturation is 100% on room air with blood pressure 107/70 and heart rate of 82     apixaban  10 mg Oral BID    Followed by    [START ON 6/12/2024] apixaban  5 mg Oral BID    sodium chloride flush  5-40 mL IntraVENous 2 times per day    furosemide  20 mg Oral Q MWF    spironolactone  25 mg Oral Daily    atorvastatin  40 mg Oral Daily    sacubitril-valsartan  1 tablet Oral BID    methocarbamol  500 mg Oral TID    gabapentin  300 mg Oral BID    famotidine  20 mg Oral BID    carvedilol  3.125 mg Oral BID WC    [Held by provider] aspirin  81 mg Oral BID    DULoxetine  20 mg Oral Daily       PHYSICAL EXAMINATION:  /70   Pulse 82   Temp 97.6 °F (36.4 °C) (Temporal)   Resp 20   Ht 1.676 m (5' 6\")   Wt 74 kg (163 lb 2.3 oz)   SpO2 100%   BMI 26.33 kg/m²     Gen: Well developed, well nourished female in no acute distress. Speaking in full sentences with out using accessory resp muscles  Eyes: PERRL, EOMI, normal conjunctivae  Ears, Nose, Mouth and Throat: Hearing is normal. Oropharynx is normal  Neck: No lymphadenopathy  Respiratory: Clear to auscultation bilaterally  CV: RRR without M/R/R  Abd: +BS, soft, NT/ND  Musculoskeletal: No cyanosis, clubbing, or edema.  Skin: No rashes, ulcers, or subcutaneous nodules  Psychiatric: Alert and oriented to time place and person    DATA  CBC:   Recent Labs     06/04/24  0025 06/04/24 0318 06/04/24  0628 06/05/24  0127   WBC 6.6 6.5  --  5.5   HGB 14.3 12.6 12.5 10.6*   HCT 43.8 37.8 36.8 31.3*   .0 99.2  --  99.0   * 122*  --  119*     BMP:   Recent Labs     06/03/24  1924 06/04/24  0318 06/05/24  0127    140 136   K 4.1 4.1 3.9   CL 
AntiXa theraputic X2, first draw was before heparin was started. Will wait for 1 more theraputic heparin before changing to daily per order.  
Bariatric Surgery   Daily Progress Note  Patient: Ousmane Walters      CC: Bleeding EKOS site    SUBJECTIVE:   Patient rested well overnight. Had some issues with bleeding this morning. Controlled with direct pressure. Pt denies any shortness of breath or difficulty swallowing.       ROS:   A 14 point review of systems was conducted, significant findings as noted above. All other systems negative.    OBJECTIVE:    PHYSICAL EXAM:    Vitals:    06/05/24 0430 06/05/24 0500 06/05/24 0530 06/05/24 0630   BP: 120/61 (!) 98/57 (!) 93/58    Pulse: 75 63 61    Resp: 15 15 20    Temp:       TempSrc:       SpO2: 100% 97% 100%    Weight:    74 kg (163 lb 2.3 oz)   Height:           General appearance: alert, no acute distress  Eyes: No scleral icterus, EOM grossly intact  Neck: trachea midline, no JVD, neck supple. Dressing in place to R neck. No strikethrough.   Chest/Lungs: normal effort with no accessory muscle use, no signs of respiratory distress  Cardiovascular: RRR   Skin: warm and dry, no rashes  Extremities: no edema, no cyanosis  Neuro: A&Ox3, no focal deficits, sensation intact    LABS:   Recent Labs     06/04/24  0318 06/04/24  0628 06/05/24  0127   WBC 6.5  --  5.5   HGB 12.6 12.5 10.6*   HCT 37.8 36.8 31.3*   MCV 99.2  --  99.0   *  --  119*        Recent Labs     06/04/24  0318 06/05/24  0127    136   K 4.1 3.9    103   CO2 26 27   PHOS 4.1 3.2   BUN 18 15   CREATININE 0.9 0.7        Recent Labs     06/03/24  1924   AST 23   ALT 19   BILITOT 0.6   ALKPHOS 38*        Recent Labs     06/03/24  1924   LIPASE 24.0        Recent Labs     06/04/24  0025   INR 1.33*   APTT >180.0*      No results for input(s): \"CKTOTAL\", \"CKMB\", \"CKMBINDEX\", \"TROPONINI\" in the last 72 hours.      ASSESSMENT & PLAN:   This is a 84 y.o. female with Hx of CAD, HLD, HTN, MI, and DVT, who was admitted with PE with R heart strain on 6/3. Underwent IR EKOS catheter placement for infusion of heparin and TPA around 11 pm. 
Confirmed with Dr. Horn that patient does not need ECHO which the order with discontinued this afternoon. Okay for discharge.     Dipti Lugo RN    
EKOS sheaths were removed around 0400. Pressure was held at sheath sit for over an hour d/t excessive bleeding. ICU residents notified and surgery residents were consulted.   
Heparin gtt restarted per order.   
Occupational Therapy  Facility/Department: [facility] ICU  Occupational Therapy Initial Assessment, Treatment and Discharge     Name: Ousmane Walters  : 1940  MRN: [id number]  Date of Service: 2024    Discharge Recommendations:  Home with Home health OT, 24 hour supervision or assist  OT Equipment Recommendations  Equipment Needed: No       Patient Diagnosis(es): The primary encounter diagnosis was Other acute pulmonary embolism with acute cor pulmonale (HCC). A diagnosis of Bilateral pulmonary embolism (HCC) was also pertinent to this visit.  Past Medical History:  has a past medical history of Acute DVT of popliteal vein of right lower extremity, Coronary artery disease, Cough, Hyperlipidemia, Hypertension, and Myocardial infarction involving LAD coronary artery.  Past Surgical History:  has a past surgical history that includes joint replacement; Breast biopsy; bone marrow biopsy; Hysterectomy; Intracapsular cataract extraction; fine needle aspiration; knee surgery; and Tunneled venous port placement.    Treatment Diagnosis: Bilateral pulmonary embolism (HCC)      Assessment   Performance deficits / Impairments: Decreased endurance;Decreased functional mobility   Assessment: Pt presents from home where she resides alone and is independent. Pt admitted to the hospital for management of B PE. Pt tolerates session overall well this date. Pt is provided with increased time and introduction of RW to promote improvement in overall balance and safety. Pt completes bed mobility, home/community distances, and toilet transfer with overal SBA. Pt politely declines ADLs at this time. Pt educated on d/c recommendations for HH OT and 24/7 assistance from family to promote safety. Pt reports having family she can contact to stay with her at d/c. Pt has no further acute skilled OT needs at [facility] time and will be d/c'd from caseload. Please re-consult as new needs arise.  Treatment Diagnosis: Bilateral pulmonary embolism 
Patient had more bleeding noted on right IJ dressing. Dr. Craig at bedside and assessed site. Pressure held to oozing site and new dressing placed.     Dipti Lugo RN    
Patient okay for discharge per MD. Discharge instructions and medication-eliquis given. IV removed and site assessment clean, dry, and intact.  No questions or concerns at this time. Transported by wheelchair to personal car with granddaughter.     Dipti Lugo RN    
Pt actively bleeding from sheath site. ICU residents notified. EKOS stopped at 0300.  
Pt has critical anti XA and aPTT. ICU residents notified.   
R IJ dressing saturated with blood. Old dressing removed, pressure held. After 15 minutes bleeding stopped and new dressing placed.   Medicine residents notified.   
Re-evaluated patient at the bedside. EKOS catheter site remains hemostatic. Recommend keeping current dressing in place today, OK to remove tomorrow. There remains no indication for acute surgical intervention.    Abhishek Singleton MD, MPH  PGY5 General Surgery  06/04/24  6:37 AM      
Right IJ access x 2 with 6 Frisian sheaths. Bilateral pulmonary arterial EKOS, plan to proceed with 1 mg tPA per hour into each of the catheters for a total infusion time of 4 hours. This should provide a total dose of 8 mg. Following completion of infusion, will plan on restarting heparin gtt.       
TRANSFER - OUT REPORT:    Verbal report given to ICU RN  on [facility] being transferred to ICU  for routine progression of patient care       Report consisted of patient's Situation, Background, Assessment and   Recommendations(SBAR).     Information from the following report(s) Nurse Handoff Report was reviewed with the receiving nurse.    Opportunity for questions and clarification was provided.      Patient transported with:   Monitor  O2 @ 3 liters    
[facility]ards Resident notified of systolics in the 90s-100s. Dr. Nieto to bedside. No concerns at this time since MAPs have been <65.   No new orders at this time.   
  PV: warm, no edema; R IJ access site with dressing intact, no bleeding       PLAN:   Agree with surgery recommendation to leave pressure dressing in place now that hemostasis has been achieved.  Continue heparin gtt.  Management per primary team.    Arianne Desai, APRN - CNP  Please feel free to call [phone] with any questions or concerns.     
Tenderness: There is no abdominal tenderness.   Musculoskeletal:      Right lower le+ Edema present.      Left lower le+ Edema present.   Skin:     General: Skin is warm and dry.   Neurological:      Mental Status: She is alert and oriented to person, place, and time.   Psychiatric:         Behavior: Behavior is cooperative.            Labs:  CBC:   Recent Labs     24  0025 2428 24  0127   WBC 6.6 6.5  --  5.5   HGB 14.3 12.6 12.5 10.6*   HCT 43.8 37.8 36.8 31.3*   * 122*  --  119*       BMP:   Recent Labs     24    140 136   K 4.1 4.1 3.9    107 103   CO2 23 26 27   BUN 21* 18 15   CREATININE 1.1 0.9 0.7   GLUCOSE 165* 111* 113*   PHOS  --  4.1 3.2     Magnesium:   Recent Labs     24   MG 2.80* 2.10     LFT's:   Recent Labs     24   AST 23   ALT 19   BILITOT 0.6   ALKPHOS 38*         U/A: No results for input(s): \"NITRITE\", \"COLORU\", \"PHUR\", \"LABCAST\", \"WBCUA\", \"RBCUA\", \"MUCUS\", \"TRICHOMONAS\", \"YEAST\", \"BACTERIA\", \"CLARITYU\", \"SPECGRAV\", \"LEUKOCYTESUR\", \"UROBILINOGEN\", \"BILIRUBINUR\", \"BLOODU\", \"GLUCOSEU\", \"KETONES\", \"AMORPHOUS\" in the last 72 hours.    Radiology:  IR ANGIOGRAM PULMONARY BILATERAL         CT CHEST PULMONARY EMBOLISM W CONTRAST   Final Result      1.  Extensive bilateral pulmonary emboli in the left main pulmonary artery and   bilateral lobar pulmonary arteries. Suspected right heart strain with borderline   elevated RV/LV ratio of 1.0.   2.  Small area of peripheral consolidation in the right upper lobe may represent   a pulmonary infarct versus chronic scar.      Preliminary findings were discussed with Dr. Oliveira.      XR CHEST (2 VW)   Final Result      No acute pulmonary disease.               Assessment & Plan   Ousmane Walters is a 83 y/o female who presented for chest pain.    Acute submassive b/l PE   Concerns for right heart strain  Patient presented 
07:24 PM    PROTEIN 8.1 04/03/2017 12:00 AM     Lab Results   Component Value Date/Time    CHOL 148 05/16/2023 08:43 AM    HDL 58 05/16/2023 08:43 AM    TRIG 56 05/16/2023 08:43 AM     UA:   Lab Results   Component Value Date/Time    COLORU DK YELLOW 02/28/2017 12:10 AM    PHUR 5.0 02/28/2017 12:10 AM    WBCUA 1 02/28/2017 12:10 AM    RBCUA 2 02/28/2017 12:10 AM    CLARITYU CLOUDY 02/28/2017 12:10 AM    LEUKOCYTESUR Negative 02/28/2017 12:10 AM    UROBILINOGEN 1.0 02/28/2017 12:10 AM    BILIRUBINUR SMALL 02/28/2017 12:10 AM    BILIRUBINUR 0.6 09/27/2013 08:52 AM    BLOODU TRACE 02/28/2017 12:10 AM    GLUCOSEU Negative 02/28/2017 12:10 AM       Imaging:   IR ANGIOGRAM PULMONARY BILATERAL         CT CHEST PULMONARY EMBOLISM W CONTRAST   Final Result      1.  Extensive bilateral pulmonary emboli in the left main pulmonary artery and   bilateral lobar pulmonary arteries. Suspected right heart strain with borderline   elevated RV/LV ratio of 1.0.   2.  Small area of peripheral consolidation in the right upper lobe may represent   a pulmonary infarct versus chronic scar.      Preliminary findings were discussed with Dr. Oliveira.      XR CHEST (2 VW)   Final Result      No acute pulmonary disease.               Assessment/Plan:  This is a 84 y.o. female with Hx of CAD, HLD, HTN, MI, and DVT, who was admitted with PE with R heart strain on 6/3. Underwent IR EKOS catheter placement for infusion of heparin and TPA around 11 pm. General surgery contacted regarding ongoing bleeding from access site.     -upon arrival noted that pressure was inadequate and being held by RN   -no providers had attempted to hold pressure  -senior resident held pressure for 15 minutes with resolution of bleeding  -d-stat and gauze dressing applied  -ICU requested a stitch. Discussed that stitching the skin shut would not resolve bleeding from the vessel.   -keep dressing in place for at least 24 hours      Kamla Calderon MD  06/04/24  5:26 AM        
ascending  Device: No Device  Assistance: Stand by assistance  Comment: safe and steady     Balance  Posture: Good  Sitting - Static: Good  Sitting - Dynamic: Good  Standing - Static: Good  Standing - Dynamic: Good         AM-PAC - Mobility  AM-PAC Basic Mobility - Inpatient   How much help is needed turning from your back to your side while in a flat bed without using bedrails?: A Little  How much help is needed moving from lying on your back to sitting on the side of a flat bed without using bedrails?: A Little  How much help is needed moving to and from a bed to a chair?: A Little  How much help is needed standing up from a chair using your arms?: A Little  How much help is needed walking in hospital room?: A Little  How much help is needed climbing 3-5 steps with a railing?: A Little  AM-[facility] Inpatient Mobility Raw Score : 18  AM-PAC Inpatient T-Scale Score : 43.63  Mobility Inpatient CMS 0-100% Score: 46.58  Mobility Inpatient CMS G-Code Modifier : CK    Education  Patient Education  Education Given To: Patient  Education Provided: Role of Therapy;Transfer Training  Education Method: Demonstration;Verbal  Barriers to Learning: None  Education Outcome: Verbalized understanding;Demonstrated understanding      Therapy Time   Individual Concurrent Group Co-treatment   Time In 1115         Time Out 1140         Minutes 25         Timed Code Treatment Minutes:  10    Total Treatment Minutes:  25    Garcia Carlson, PT, DPT

## 2024-06-05 NOTE — PLAN OF CARE
Problem: Discharge Planning  Goal: Discharge to home or other facility with appropriate resources  6/5/2024 0017 by Zeenat Cunningham RN  Outcome: Progressing  6/4/2024 1048 by Chon Naranjo RN  Outcome: Progressing  Flowsheets (Taken 6/4/2024 0800)  Discharge to home or other facility with appropriate resources: Identify barriers to discharge with patient and caregiver     Problem: Pain  Goal: Verbalizes/displays adequate comfort level or baseline comfort level  6/5/2024 0017 by Zeenat Cunningham RN  Outcome: Progressing  Flowsheets  Taken 6/4/2024 2000 by Zeenat Cunningham RN  Verbalizes/displays adequate comfort level or baseline comfort level: Encourage patient to monitor pain and request assistance  Taken 6/4/2024 1500 by Chon Naranjo RN  Verbalizes/displays adequate comfort level or baseline comfort level: Encourage patient to monitor pain and request assistance  Taken 6/4/2024 1200 by Chon Naranjo RN  Verbalizes/displays adequate comfort level or baseline comfort level: Encourage patient to monitor pain and request assistance  6/4/2024 1048 by Chon Naranjo RN  Outcome: Progressing  Flowsheets (Taken 6/4/2024 0800)  Verbalizes/displays adequate comfort level or baseline comfort level: Encourage patient to monitor pain and request assistance     Problem: Safety - Adult  Goal: Free from fall injury  6/5/2024 0017 by Zeenat Cunningham RN  Outcome: Progressing  Flowsheets (Taken 6/5/2024 0017)  Free From Fall Injury: Instruct family/caregiver on patient safety  6/4/2024 1048 by Chon Naranjo RN  Outcome: Progressing  Flowsheets (Taken 6/4/2024 1030)  Free From Fall Injury: Instruct family/caregiver on patient safety     Problem: ABCDS Injury Assessment  Goal: Absence of physical injury  6/5/2024 0017 by Zeenat Cunningham RN  Outcome: Progressing  Flowsheets (Taken 6/5/2024 0017)  Absence of Physical Injury: Implement safety measures based on patient assessment  6/4/2024 1048 by Chon Naranjo RN  Outcome:

## 2024-06-05 NOTE — PLAN OF CARE
VSS. NSR on monitor. Patient is alert and oriented, denies any pain. Patient on regular diet, tolerating well. Turns self well for pressure ulcer prevention. Ambulates to restroom with walker. Medication for DVT prophylaxis. Free from any injury. Patient and family updated on plan of care. Will continue plan of care. Patient okay to discharge home per MD. Patient has had no further bleeding since 1120 episode.          Problem: Discharge Planning  Goal: Discharge to home or other facility with appropriate resources  Outcome: Adequate for Discharge  Flowsheets (Taken 6/5/2024 0800)  Discharge to home or other facility with appropriate resources:   Identify barriers to discharge with patient and caregiver   Arrange for needed discharge resources and transportation as appropriate   Identify discharge learning needs (meds, wound care, etc)     Problem: Pain  Goal: Verbalizes/displays adequate comfort level or baseline comfort level  Outcome: Adequate for Discharge     Problem: Safety - Adult  Goal: Free from fall injury  Outcome: Adequate for Discharge     Problem: ABCDS Injury Assessment  Goal: Absence of physical injury  Outcome: Adequate for Discharge

## 2024-06-06 ENCOUNTER — TELEPHONE (OUTPATIENT)
Dept: PULMONOLOGY | Age: 84
End: 2024-06-06

## 2024-06-06 LAB — ECHO BSA: 1.81 M2

## 2024-06-06 NOTE — TELEPHONE ENCOUNTER
LVM explaining that she does not need to come in and see Dr Bernstein, that she can f/u with her pulmonologist at [facility].  Left my name and number if she should have any questions

## 2024-06-06 NOTE — TELEPHONE ENCOUNTER
Patient returned  the missed call.  Message was relayed that she can see her  pulmonologist.  She expressed understanding.

## 2024-06-06 NOTE — TELEPHONE ENCOUNTER
She called to get a hospital f/u appointment here at [place], your next available will not be until September.  Will she be able to wait that long or do you want to fit her in somewhere?  Please Advise.  Thanks

## 2024-06-10 LAB
F2 C.20210G>A GENO BLD/T: NEGATIVE
F5 P.R506Q BLD/T QL: NEGATIVE
SPECIMEN SOURCE: NORMAL
SPECIMEN SOURCE: NORMAL

## 2024-07-25 NOTE — TELEPHONE ENCOUNTER
Requested Prescriptions     Pending Prescriptions Disp Refills    sacubitril-valsartan (ENTRESTO) 24-26 MG per tablet 180 tablet 3     Sig: Take 1 tablet by mouth 2 times daily            Checked Correct Pharmacy: Yes    Any changes since last refill? No     Number: 180    Refills: 3    Last Office Visit: 5/29/2024     Next Office Visit: 11/22/2024         Last Labs: 06.05.2024

## 2024-07-25 NOTE — TELEPHONE ENCOUNTER
Pt called requesting Rx refill. Please send Rx to [facility] not [facility].      Medication  sacubitril-valsartan (ENTRESTO) 24-26 MG per tablet [[id number]]  sacubitril-valsartan (ENTRESTO) 24-26 MG per tablet [[id number]]    Order Details  Dose: 1 tablet Route: Oral Frequency: 2 TIMES DAILY   Dispense Quantity: 180 tablet Refills: 3          Sig: Take 1 tablet by mouth 2 times daily         Pharmacy  [facility] ([facility]) - [place] [address] [address] - P [phone] - F [phone] [[id number]]

## 2024-08-14 NOTE — TELEPHONE ENCOUNTER
[facility] called stating they never received Rx for Entresto. They are not sure if there was a glitch in the computer system on their end. If possible they would like it resent.     Medication  sacubitril-valsartan (ENTRESTO) 24-26 MG per tablet [[id number]]  sacubitril-valsartan (ENTRESTO) 24-26 MG per tablet [[id number]]    Order Details  Dose: 1 tablet Route: Oral Frequency: 2 TIMES DAILY   Dispense Quantity: 180 tablet Refills: 3          Sig: Take 1 tablet by mouth 2 times daily         Pharmacy    [facility] ([facility]) - [place] [address] [address] - [place] [phone] - F [phone]  [facility] [address]  Phone: [phone]  Fax: [phone]

## 2024-08-19 ENCOUNTER — APPOINTMENT (OUTPATIENT)
Dept: GENERAL RADIOLOGY | Age: 84
End: 2024-08-19
Payer: MEDICARE

## 2024-08-19 ENCOUNTER — HOSPITAL ENCOUNTER (EMERGENCY)
Age: 84
Discharge: HOME | End: 2024-08-19
Attending: EMERGENCY MEDICINE
Payer: MEDICARE

## 2024-08-19 VITALS
RESPIRATION RATE: 19 BRPM | WEIGHT: 160.3 LBS | BODY MASS INDEX: 25.76 KG/M2 | OXYGEN SATURATION: 94 % | SYSTOLIC BLOOD PRESSURE: 115 MMHG | DIASTOLIC BLOOD PRESSURE: 57 MMHG | HEART RATE: 73 BPM | TEMPERATURE: 98.4 F | HEIGHT: 66 IN

## 2024-08-19 DIAGNOSIS — U07.1 COVID-19: Primary | ICD-10-CM

## 2024-08-19 LAB
ACANTHOCYTES BLD QL SMEAR: ABNORMAL
ANION GAP SERPL CALCULATED.3IONS-SCNC: 10 MMOL/L (ref 3–16)
BACTERIA URNS QL MICRO: ABNORMAL /HPF
BASOPHILS # BLD: 0 K/UL (ref 0–0.2)
BASOPHILS NFR BLD: 0 %
BILIRUB UR QL STRIP.AUTO: NEGATIVE
BUN SERPL-MCNC: 12 MG/DL (ref 7–20)
CALCIUM SERPL-MCNC: 8.4 MG/DL (ref 8.3–10.6)
CHLORIDE SERPL-SCNC: 99 MMOL/L (ref 99–110)
CLARITY UR: CLEAR
CO2 SERPL-SCNC: 26 MMOL/L (ref 21–32)
COLOR UR: YELLOW
CREAT SERPL-MCNC: 1 MG/DL (ref 0.6–1.2)
DEPRECATED RDW RBC AUTO: 13.6 % (ref 12.4–15.4)
EOSINOPHIL # BLD: 0 K/UL (ref 0–0.6)
EOSINOPHIL NFR BLD: 1 %
EPI CELLS #/AREA URNS HPF: ABNORMAL /HPF (ref 0–5)
FLUAV RNA RESP QL NAA+PROBE: NOT DETECTED
FLUBV RNA RESP QL NAA+PROBE: NOT DETECTED
GFR SERPLBLD CREATININE-BSD FMLA CKD-EPI: 55 ML/MIN/{1.73_M2}
GLUCOSE SERPL-MCNC: 97 MG/DL (ref 70–99)
GLUCOSE UR STRIP.AUTO-MCNC: NEGATIVE MG/DL
HCT VFR BLD AUTO: 33.1 % (ref 36–48)
HGB BLD-MCNC: 10.9 G/DL (ref 12–16)
HGB UR QL STRIP.AUTO: ABNORMAL
KETONES UR STRIP.AUTO-MCNC: NEGATIVE MG/DL
LACTATE BLDV-SCNC: 0.8 MMOL/L (ref 0.4–1.9)
LEUKOCYTE ESTERASE UR QL STRIP.AUTO: NEGATIVE
LYMPHOCYTES # BLD: 0.4 K/UL (ref 1–5.1)
LYMPHOCYTES NFR BLD: 13 %
MCH RBC QN AUTO: 33 PG (ref 26–34)
MCHC RBC AUTO-ENTMCNC: 33 G/DL (ref 31–36)
MCV RBC AUTO: 100.1 FL (ref 80–100)
MONOCYTES # BLD: 0.3 K/UL (ref 0–1.3)
MONOCYTES NFR BLD: 10 %
NEUTROPHILS # BLD: 2.6 K/UL (ref 1.7–7.7)
NEUTROPHILS NFR BLD: 76 %
NITRITE UR QL STRIP.AUTO: NEGATIVE
OVALOCYTES BLD QL SMEAR: ABNORMAL
PH UR STRIP.AUTO: 6 [PH] (ref 5–8)
PLATELET # BLD AUTO: 115 K/UL (ref 135–450)
PMV BLD AUTO: 9 FL (ref 5–10.5)
POIKILOCYTOSIS BLD QL SMEAR: ABNORMAL
POTASSIUM SERPL-SCNC: 3.8 MMOL/L (ref 3.5–5.1)
PROT UR STRIP.AUTO-MCNC: NEGATIVE MG/DL
RBC # BLD AUTO: 3.31 M/UL (ref 4–5.2)
RBC #/AREA URNS HPF: ABNORMAL /HPF (ref 0–4)
SARS-COV-2 RNA RESP QL NAA+PROBE: DETECTED
SCHISTOCYTES BLD QL SMEAR: ABNORMAL
SODIUM SERPL-SCNC: 135 MMOL/L (ref 136–145)
SP GR UR STRIP.AUTO: 1.01 (ref 1–1.03)
UA COMPLETE W REFLEX CULTURE PNL UR: ABNORMAL
UA DIPSTICK W REFLEX MICRO PNL UR: YES
URN SPEC COLLECT METH UR: ABNORMAL
UROBILINOGEN UR STRIP-ACNC: 0.2 E.U./DL
WBC # BLD AUTO: 3.4 K/UL (ref 4–11)
WBC #/AREA URNS HPF: ABNORMAL /HPF (ref 0–5)

## 2024-08-19 PROCEDURE — 36415 COLL VENOUS BLD VENIPUNCTURE: CPT

## 2024-08-19 PROCEDURE — 81001 URINALYSIS AUTO W/SCOPE: CPT

## 2024-08-19 PROCEDURE — 85025 COMPLETE CBC W/AUTO DIFF WBC: CPT

## 2024-08-19 PROCEDURE — 71046 X-RAY EXAM CHEST 2 VIEWS: CPT

## 2024-08-19 PROCEDURE — 84145 PROCALCITONIN (PCT): CPT

## 2024-08-19 PROCEDURE — 87636 SARSCOV2 & INF A&B AMP PRB: CPT

## 2024-08-19 PROCEDURE — 80048 BASIC METABOLIC PNL TOTAL CA: CPT

## 2024-08-19 PROCEDURE — 83605 ASSAY OF LACTIC ACID: CPT

## 2024-08-19 PROCEDURE — 99284 EMERGENCY DEPT VISIT MOD MDM: CPT

## 2024-08-19 ASSESSMENT — PAIN SCALES - GENERAL: PAINLEVEL_OUTOF10: 0

## 2024-08-19 ASSESSMENT — LIFESTYLE VARIABLES
HOW OFTEN DO YOU HAVE A DRINK CONTAINING ALCOHOL: NEVER
HOW MANY STANDARD DRINKS CONTAINING ALCOHOL DO YOU HAVE ON A TYPICAL DAY: PATIENT DOES NOT DRINK

## 2024-08-19 ASSESSMENT — PAIN - FUNCTIONAL ASSESSMENT
PAIN_FUNCTIONAL_ASSESSMENT: NONE - DENIES PAIN
PAIN_FUNCTIONAL_ASSESSMENT: 0-10

## 2024-08-20 LAB — PROCALCITONIN SERPL IA-MCNC: 0.09 NG/ML (ref 0–0.15)

## 2024-08-20 ASSESSMENT — ENCOUNTER SYMPTOMS
COUGH: 0
ABDOMINAL PAIN: 0
SORE THROAT: 0
COLOR CHANGE: 0
SHORTNESS OF BREATH: 0
VOMITING: 0
CHEST TIGHTNESS: 0
DIARRHEA: 0
WHEEZING: 0
NAUSEA: 0
EYE PAIN: 0

## 2024-08-20 NOTE — DISCHARGE INSTRUCTIONS
You were seen in the emergency department for fever. Your exam, labs, and x-ray showed that you have Covid, but no other concerning findings at this time.    Please stay well hydrated and get plenty of rest. Wear a mask if you need to be around others for the next 5 days, unless you have fevers extending beyond that time.    Take the Paxlovid we have prescribed to help shorten your course and make it less severe. You must stop your cholesterol medication, simvastatin, while you are taking Paxlovid as these medications can interact.    Call 911 or go to the nearest Emergency Department immediately for worsening symptoms, difficulty breathing, chest pain, lightheadedness, difficulty moving or talking, sensory loss, difficulty controlling your bowel or bladder function, altered level of consciousness, neck stiffness, uncontrollable nausea or vomiting, uncontrollable pain, inability to tolerate oral intake, fever, chills, severe bleeding, signs of infection (spreading redness, area feels very warm, swelling, pain, foul-smelling drainage), thoughts of harming yourself or others, or any other concerns.    If we have prescribed you any new medications, please take them as prescribed and read the drug package insert carefully.  Do not drink alcohol, drive, or operate machinery if you are taking narcotic pain medications.    Your condition requires follow-up with your primary care provider, Jeff Magaña MD, within 4 days, please see above for details. Bring these discharge instructions with you when you see your doctor. Avoid all strenuous activity until you have checked with your primary care provider.

## 2024-08-20 NOTE — ED PROVIDER NOTES
DETECTED    Influenza B NOT DETECTED NOT DETECTED   CBC with Auto Differential   Result Value Ref Range    WBC 3.4 (L) 4.0 - 11.0 K/uL    RBC 3.31 (L) 4.00 - 5.20 M/uL    Hemoglobin 10.9 (L) 12.0 - 16.0 g/dL    Hematocrit 33.1 (L) 36.0 - 48.0 %    .1 (H) 80.0 - 100.0 fL    MCH 33.0 26.0 - 34.0 pg    MCHC 33.0 31.0 - 36.0 g/dL    RDW 13.6 12.4 - 15.4 %    Platelets 115 (L) 135 - 450 K/uL    MPV 9.0 5.0 - 10.5 fL    Neutrophils % 76.0 %    Lymphocytes % 13.0 %    Monocytes % 10.0 %    Eosinophils % 1.0 %    Basophils % 0.0 %    Neutrophils Absolute 2.6 1.7 - 7.7 K/uL    Lymphocytes Absolute 0.4 (L) 1.0 - 5.1 K/uL    Monocytes Absolute 0.3 0.0 - 1.3 K/uL    Eosinophils Absolute 0.0 0.0 - 0.6 K/uL    Basophils Absolute 0.0 0.0 - 0.2 K/uL    Poikilocytes Occasional (A)     Schistocytes Occasional (A)     Acanthocytes Occasional (A)     Ovalocytes Occasional (A)    BMP w/ Reflex to MG   Result Value Ref Range    Sodium 135 (L) 136 - 145 mmol/L    Potassium reflex Magnesium 3.8 3.5 - 5.1 mmol/L    Chloride 99 99 - 110 mmol/L    CO2 26 21 - 32 mmol/L    Anion Gap 10 3 - 16    Glucose 97 70 - 99 mg/dL    BUN 12 7 - 20 mg/dL    Creatinine 1.0 0.6 - 1.2 mg/dL    Est, Glom Filt Rate 55 (A) >60    Calcium 8.4 8.3 - 10.6 mg/dL   Lactate, Sepsis   Result Value Ref Range    Lactic Acid, Sepsis 0.8 0.4 - 1.9 mmol/L   Urinalysis with Reflex to Culture    Specimen: Urine   Result Value Ref Range    Color, UA Yellow Straw/Yellow    Clarity, UA Clear Clear    Glucose, Ur Negative Negative mg/dL    Bilirubin, Urine Negative Negative    Ketones, Urine Negative Negative mg/dL    Specific Gravity, UA 1.010 1.005 - 1.030    Blood, Urine MODERATE (A) Negative    pH, Urine 6.0 5.0 - 8.0    Protein, UA Negative Negative mg/dL    Urobilinogen, Urine 0.2 <2.0 E.U./dL    Nitrite, Urine Negative Negative    Leukocyte Esterase, Urine Negative Negative    Microscopic Examination YES     Urine Type Voided     Urine Reflex to Culture Not

## 2024-09-06 NOTE — TELEPHONE ENCOUNTER
Requested Prescriptions      No prescriptions requested or ordered in this encounter            Last Office Visit: 11/20/2023     Next Office Visit: 5/29/2024      [de-identified] : General: Well-nourished, well-developed, alert, and in no acute distress. Head: Normocephalic. Eyes: Pupils equal, extraocular muscles intact, normal sclera. Nose: No nasal discharge. Cardiovascular: Extremities are warm and well perfused. Distal pulses are symmetric bilaterally. Respiratory: No labored breathing. Extremities: Sensation is intact distally bilaterally. Distal pulses are symmetric bilaterally Lymphatic: No regional lymphadenopathy, no lymphedema Neurologic: No focal deficits Skin: Normal skin color, texture, and turgor Psychiatric: Normal affect  MSK: Examination of the Lumbar Spine: Gait normal Ambulating independently. Able to toe walk, heel walk, tandem walk AROM: forward flexion, extension, lateral flexion, rotation Nontender to palpation: midline, paraspinals, SI jt, GTB, piriformis, gluteals   Lumbar Facet Loading [negative]   Right hip Range of Motion: Internal rotation: [25] degrees, External rotation: [80] degrees, Flexion [120] degrees     Log roll negative LAUREEN negative FADIR negative   SLR negative. Tight Hamstrings Piriformis compression negative ASIS distraction negative Iliac compression negative   Left hip:   Range of Motion: Internal rotation: [25] degrees, External rotation: [80] degrees, Flexion [120] degrees   Special tests: LAUREEN negative FADIR negative   SLR negative. Tight Hamstrings Piriformis compression negative ASIS distraction negative Iliac compression negative     Sensation is intact to light touch over the superficial and deep peroneal nerve distributions and the posterior tibial nerve distribution. Capillary refill is less than two seconds. Posterior tibial and dorsalis pedis pulses 2+ equal bilaterally. No calf swelling or tenderness bilaterally. Strength testing shows Hip flexion 5/5, Hip adduction 5/5, Hip abduction 5/5, Knee Extension 5/5, Knee Flexion 5/5, dorsiflexion 5/5, plantar flexion 5/5, EHL 5/5 Reflexes: Patellar 2+, Achilles 2+.  [de-identified] : Date: 09/03/2024 Location: [facility] Body part: L-SPINE x-ray independently reviewed and interpreted by me.  Impression: There is no evidence of fracture. Normal alignment. The intervertebral disc spaces are maintained.  There is no evidence of spondylolisthesis. Multilevel facet arthrosis.

## 2024-11-20 NOTE — PATIENT INSTRUCTIONS
Thank you for choosing [facility] for your cardiac care.    During your visit today, we reviewed and confirmed your cardiac medications along with  medication prescribed by your other healthcare team members. Please be sure to discuss any  changes to medication with your providers.    Please bring a list of ALL medications (or the bottles) with you to EVERY appointment.  Also include vitamins and over-the-counter medications.    If you need refills for any cardiac medications, please call your pharmacy and they will reach out to us electronically.    Did your provider order testing today? If yes, then you will receive your results in three  possible ways. You can receive a [employer] message, a phone call, or letter in the mail. Please  note, if you are an active [employer] user, some of your testing will be available within 1-2 days.    Finally, please know that it is good for your heart to exercise and follow a healthy, low-fat diet  as advised by your physician and health care providers.    If you are experiencing a medical emergency, please call 911 immediately.    It's easy to register for a [employer] account if you don't already have one. With a [employer]  account you can manage your health record, view test results, schedule appointments and  more.     Dr. Poole's clinical staff can be reached at the following phone number: [phone]    If any cardiac testing is ordered, please contact central scheduling at [phone] to get your test scheduled.     Keep feet elevated at night. Try putting a blanket under your mattress to elevate your feet

## 2024-11-20 NOTE — PROGRESS NOTES
[facility]     Outpatient Cardiology         Patient Name:  Ousmane Walters  Primary Care Physician: Jeff Magaña MD  11/22/24     Assessment & Plan    Assessment / Plan:     CAD, history of 5 coronary stents, LAD stent in 2000.  No angina.  Check lipid panel.  Essential hypertension-well-controlled.  Pulmonary embolism-on Eliquis.  No bleeding.  Mild LV systolic dysfunction in 2017, LVEF was 45 to 50%.  Has mild swelling.  Will recheck echocardiogram.  EF EF is still low, can consider SGLT2 inhibitor.  Discussed leg elevation.  Stable from cardiac standpoint.  Follow-up in 6 months or sooner if needed.              Chief Complaint:     Chief Complaint   Patient presents with    6 Month Follow-Up       History of Present Illness:       HPI     Ousmane Walters is a 84 y.o. female with PMH of systolic heart failure, PE, CAD, HTN and HLD here for a follow up.  Previously seen by Dr Gardner.    Today she reports she is doing well.  She does have bilateral lower leg edema.  Patient denies any chest pain, shortness of breath, palpitations, presyncope or syncope. No TIA. No claudication. No recent hospitalizations    Reviewed medications, tests and labs    PMH  Past Medical History:   Diagnosis Date    Acute DVT of popliteal vein of right lower extremity 04/07/2017    Coronary artery disease 2000    Cough     Hyperlipidemia     Hypertension     Myocardial infarction involving LAD coronary artery 10/11/2000       PSH  Past Surgical History:   Procedure Laterality Date    BONE MARROW BIOPSY      BREAST BIOPSY      FINE NEEDLE ASPIRATION      HYSTERECTOMY (CERVIX STATUS UNKNOWN)      INTRACAPSULAR CATARACT EXTRACTION      JOINT REPLACEMENT      KNEE SURGERY      TUNNELED VENOUS PORT PLACEMENT          Social HIstory  Social History     Tobacco Use    Smoking status: Never    Smokeless tobacco: Never   Substance Use Topics    Alcohol use: No    Drug use: No       Family History  Family History

## 2024-11-22 ENCOUNTER — OFFICE VISIT (OUTPATIENT)
Dept: CARDIOLOGY CLINIC | Age: 84
End: 2024-11-22

## 2024-11-22 VITALS
HEART RATE: 73 BPM | DIASTOLIC BLOOD PRESSURE: 62 MMHG | BODY MASS INDEX: 24.08 KG/M2 | WEIGHT: 149.2 LBS | SYSTOLIC BLOOD PRESSURE: 110 MMHG

## 2024-11-22 DIAGNOSIS — I25.10 ASHD (ARTERIOSCLEROTIC HEART DISEASE): ICD-10-CM

## 2024-11-22 DIAGNOSIS — I50.22 CHRONIC SYSTOLIC CONGESTIVE HEART FAILURE (HCC): ICD-10-CM

## 2024-11-22 DIAGNOSIS — E78.2 MIXED HYPERLIPIDEMIA: Primary | ICD-10-CM

## 2024-11-22 RX ORDER — FLUTICASONE PROPIONATE 50 MCG
1 SPRAY, SUSPENSION (ML) NASAL DAILY PRN
COMMUNITY
Start: 2024-10-29

## 2024-12-09 ENCOUNTER — HOSPITAL ENCOUNTER (OUTPATIENT)
Age: 84
Discharge: HOME | End: 2024-12-11
Attending: INTERNAL MEDICINE
Payer: MEDICARE

## 2024-12-09 DIAGNOSIS — I50.22 CHRONIC SYSTOLIC CONGESTIVE HEART FAILURE (HCC): ICD-10-CM

## 2024-12-09 LAB
ECHO AO ASC DIAM: 3.4 CM
ECHO AO ROOT DIAM: 3.1 CM
ECHO AV AREA PEAK VELOCITY: 1.6 CM2
ECHO AV AREA VTI: 2.1 CM2
ECHO AV MEAN GRADIENT: 3 MMHG
ECHO AV MEAN VELOCITY: 0.8 M/S
ECHO AV PEAK GRADIENT: 5 MMHG
ECHO AV PEAK VELOCITY: 1.1 M/S
ECHO AV VELOCITY RATIO: 0.64
ECHO AV VTI: 23.1 CM
ECHO EST RA PRESSURE: 3 MMHG
ECHO LA AREA 2C: 24.4 CM2
ECHO LA AREA 4C: 18.7 CM2
ECHO LA DIAMETER: 3 CM
ECHO LA MAJOR AXIS: 5.1 CM
ECHO LA MINOR AXIS: 5.5 CM
ECHO LA TO AORTIC ROOT RATIO: 0.97
ECHO LA VOL BP: 73 ML (ref 22–52)
ECHO LA VOL MOD A2C: 91 ML (ref 22–52)
ECHO LA VOL MOD A4C: 56 ML (ref 22–52)
ECHO LV E' LATERAL VELOCITY: 6.64 CM/S
ECHO LV E' SEPTAL VELOCITY: 3.59 CM/S
ECHO LV EF PHYSICIAN: 53 %
ECHO LV FRACTIONAL SHORTENING: 45 % (ref 28–44)
ECHO LV INTERNAL DIMENSION DIASTOLIC: 3.8 CM (ref 3.9–5.3)
ECHO LV INTERNAL DIMENSION SYSTOLIC: 2.1 CM
ECHO LV IVSD: 1 CM (ref 0.6–0.9)
ECHO LV MASS 2D: 125.8 G (ref 67–162)
ECHO LV POSTERIOR WALL DIASTOLIC: 1.1 CM (ref 0.6–0.9)
ECHO LV RELATIVE WALL THICKNESS RATIO: 0.58
ECHO LVOT AREA: 2.5 CM2
ECHO LVOT AV VTI INDEX: 0.84
ECHO LVOT DIAM: 1.8 CM
ECHO LVOT MEAN GRADIENT: 1 MMHG
ECHO LVOT PEAK GRADIENT: 2 MMHG
ECHO LVOT PEAK VELOCITY: 0.7 M/S
ECHO LVOT SV: 49.3 ML
ECHO LVOT VTI: 19.4 CM
ECHO MV A VELOCITY: 0.89 M/S
ECHO MV E DECELERATION TIME (DT): 312 MS
ECHO MV E VELOCITY: 0.64 M/S
ECHO MV E/A RATIO: 0.72
ECHO MV E/E' LATERAL: 9.64
ECHO MV E/E' RATIO (AVERAGED): 13.73
ECHO MV E/E' SEPTAL: 17.83
ECHO PULMONARY ARTERY END DIASTOLIC PRESSURE: 11 MMHG
ECHO PULMONARY ARTERY END DIASTOLIC PRESSURE: 3 MMHG
ECHO PV MAX VELOCITY: 1.6 M/S
ECHO PV REGURGITANT MAX VELOCITY: 0.9 M/S
ECHO RA AREA 4C: 16 CM2
ECHO RA VOLUME: 44 ML
ECHO RIGHT VENTRICULAR SYSTOLIC PRESSURE (RVSP): 23 MMHG
ECHO RV FREE WALL PEAK S': 13.6 CM/S
ECHO RV INTERNAL DIMENSION: 3.2 CM
ECHO RV TAPSE: 2.9 CM (ref 1.7–?)
ECHO TV REGURGITANT MAX VELOCITY: 2.24 M/S
ECHO TV REGURGITANT PEAK GRADIENT: 20 MMHG

## 2024-12-09 PROCEDURE — 93306 TTE W/DOPPLER COMPLETE: CPT | Performed by: INTERNAL MEDICINE

## 2024-12-09 PROCEDURE — C8929 TTE W OR WO FOL WCON,DOPPLER: HCPCS

## 2024-12-09 PROCEDURE — 6360000004 HC RX CONTRAST MEDICATION: Performed by: INTERNAL MEDICINE

## 2024-12-09 RX ADMIN — SULFUR HEXAFLUORIDE 2 ML: 60.7; .19; .19 INJECTION, POWDER, LYOPHILIZED, FOR SUSPENSION INTRAVENOUS; INTRAVESICAL at 14:44

## 2025-01-02 ENCOUNTER — TELEPHONE (OUTPATIENT)
Dept: CARDIOLOGY CLINIC | Age: 85
End: 2025-01-02

## 2025-01-02 NOTE — TELEPHONE ENCOUNTER
The patient called stating she needs an update [employer] Assistance application started for the medication listed below.      Medication  sacubitril-valsartan (ENTRESTO) 24-26 MG per tablet [[id number]]  sacubitril-valsartan (ENTRESTO) 24-26 MG per tablet [[id number]]    Order Details  Dose: 1 tablet Route: Oral Frequency: 2 TIMES DAILY   Dispense Quantity: 180 tablet Refills: 3          Sig: Take 1 tablet by mouth 2 times daily

## 2025-01-22 NOTE — TELEPHONE ENCOUNTER
Rx refill request    Medication  sacubitril-valsartan (ENTRESTO) 24-26 MG per tablet [[id number]]  sacubitril-valsartan (ENTRESTO) 24-26 MG per tablet [[id number]]    Order Details  Dose: 1 tablet Route: Oral Frequency: 2 TIMES DAILY   Dispense Quantity: 180 tablet Refills: 3          Sig: Take 1 tablet by mouth 2 times daily       Pharmacy    [facility] - [facility] [address] -  [phone] - F [phone]  [address], [place] [address]  Phone: [phone]  Fax: [phone]

## 2025-01-23 NOTE — TELEPHONE ENCOUNTER
Requested Prescriptions     Pending Prescriptions Disp Refills    sacubitril-valsartan (ENTRESTO) 24-26 MG per tablet 180 tablet 3     Sig: Take 1 tablet by mouth 2 times daily            Checked Correct Pharmacy: Yes    Any changes since last refill? No     Number: 180  Refills: 3    Last OV: 11/22/2024 Provider: CARTER    Next OV: 5/22/2025 Provider: CARTER    Last Labs:   CBC:   Lab Results   Component Value Date    WBC 3.4 (L) 08/19/2024    HGB 10.9 (L) 08/19/2024    HCT 33.1 (L) 08/19/2024    .1 (H) 08/19/2024     (L) 08/19/2024     BMP:   Lab Results   Component Value Date/Time     08/19/2024 09:05 PM    K 3.8 08/19/2024 09:05 PM    CL 99 08/19/2024 09:05 PM    CO2 26 08/19/2024 09:05 PM    BUN 12 08/19/2024 09:05 PM    CREATININE 1.0 08/19/2024 09:05 PM    GLUCOSE 97 08/19/2024 09:05 PM    GLUCOSE 131 05/24/2016 03:33 PM    CALCIUM 8.4 08/19/2024 09:05 PM    LABGLOM 55 08/19/2024 09:05 PM    LABGLOM >60 05/16/2023 08:43 AM

## 2025-01-30 ENCOUNTER — HOSPITAL ENCOUNTER (EMERGENCY)
Age: 85
Discharge: HOME | End: 2025-01-30
Attending: STUDENT IN AN ORGANIZED HEALTH CARE EDUCATION/TRAINING PROGRAM
Payer: MEDICARE

## 2025-01-30 VITALS
SYSTOLIC BLOOD PRESSURE: 111 MMHG | BODY MASS INDEX: 23.3 KG/M2 | TEMPERATURE: 98.9 F | DIASTOLIC BLOOD PRESSURE: 60 MMHG | RESPIRATION RATE: 16 BRPM | WEIGHT: 145 LBS | HEART RATE: 80 BPM | OXYGEN SATURATION: 100 % | HEIGHT: 66 IN

## 2025-01-30 DIAGNOSIS — J10.1 INFLUENZA A: Primary | ICD-10-CM

## 2025-01-30 LAB
FLUAV RNA RESP QL NAA+PROBE: DETECTED
FLUBV RNA RESP QL NAA+PROBE: NOT DETECTED
ORGANISM: ABNORMAL
REPORT: NORMAL
RESP PATH DNA+RNA PNL NPH NAA+NON-PROBE: ABNORMAL
SARS-COV-2 RNA RESP QL NAA+PROBE: NOT DETECTED

## 2025-01-30 PROCEDURE — 87636 SARSCOV2 & INF A&B AMP PRB: CPT

## 2025-01-30 PROCEDURE — 0202U NFCT DS 22 TRGT SARS-COV-2: CPT

## 2025-01-30 PROCEDURE — 99283 EMERGENCY DEPT VISIT LOW MDM: CPT

## 2025-01-30 ASSESSMENT — PAIN - FUNCTIONAL ASSESSMENT: PAIN_FUNCTIONAL_ASSESSMENT: NONE - DENIES PAIN

## 2025-01-30 NOTE — ED PROVIDER NOTES
[facility]  EMERGENCY DEPARTMENT ENCOUNTER          ATTENDING PHYSICIAN NOTE       Date of evaluation: 1/30/2025    Chief Complaint     Fever      History of Present Illness     Ousmane Walters is a 84 y.o. female who presents with past medical history of CAD, DVT on apixaban, GERD presenting with fever at home.  She states that her temperature was in the 99's and the last time she had an elevated temperature she had COVID which prompted her to come be evaluated today.  She had a cough and some bodyaches last week but those have since resolved.  Presently, she is asymptomatic.    ASSESSMENT / PLAN  (MEDICAL DECISION MAKING)     INITIAL VITALS: BP: (!) 148/77, Temp: 98.9 °F (37.2 °C), Pulse: 86, Respirations: 20, SpO2: 93 %      Ousmane Walters is a 84 y.o. female who presents with past medical history of CAD, DVT on apixaban, GERD presenting with fever at home.    On examination, the patient is afebrile, hemodynamically stable, and asymptomatic.  Will swab for COVID and flu as the patient had flulike symptoms a few days ago.    Patient is notably influenza A positive, likely etiology of her symptoms on her mild temperature elevation at home.  As she is otherwise hemodynamically stable, afebrile, and resting comfortably, normoxic on room air, will discharge her home.  She is out of the window of treatment for Tamiflu.  Encouraged her to follow-up with her PCP should her symptoms not be improving and for ensuring to the emergency department with chest pain, shortness of breath, fevers or chills, inability to tolerate p.o. encouraged Tylenol or ibuprofen as needed for body aches    Is this patient to be included in the SEP-1 core measure? No Exclusion criteria - the patient is NOT to be included for SEP-1 Core Measure due to: Viral etiology found or highly suspected (including COVID-19) without concomitant bacterial infection    Medical Decision Making  Problems Addressed:  Influenza A: acute illness or injury

## 2025-02-24 RX ORDER — CARVEDILOL 3.12 MG/1
3.12 TABLET ORAL 2 TIMES DAILY WITH MEALS
Qty: 180 TABLET | Refills: 3 | Status: SHIPPED | OUTPATIENT
Start: 2025-02-24

## 2025-02-24 RX ORDER — SPIRONOLACTONE 25 MG/1
25 TABLET ORAL DAILY
Qty: 90 TABLET | Refills: 3 | Status: SHIPPED | OUTPATIENT
Start: 2025-02-24

## 2025-02-24 NOTE — TELEPHONE ENCOUNTER
Requested Prescriptions     Pending Prescriptions Disp Refills    spironolactone (ALDACTONE) 25 MG tablet [Pharmacy Med Name: Spironolactone Oral Tablet 25 MG] 90 tablet 3     Sig: TAKE 1 TABLET EVERY DAY    carvedilol (COREG) 3.125 MG tablet [Pharmacy Med Name: Carvedilol Oral Tablet 3.125 MG] 180 tablet 3     Sig: TAKE 1 TABLET TWICE DAILY WITH MEALS            Checked Correct Pharmacy: Yes    Any changes since last refill? No           Last OV:05.29.2024 Provider: kim    Next OV: 05.22.2025Provider: CARTER    Last Labs:   CBC:   Lab Results   Component Value Date    WBC 3.4 (L) 08/19/2024    HGB 10.9 (L) 08/19/2024    HCT 33.1 (L) 08/19/2024    .1 (H) 08/19/2024     (L) 08/19/2024     BMP:   Lab Results   Component Value Date/Time     08/19/2024 09:05 PM    K 3.8 08/19/2024 09:05 PM    CL 99 08/19/2024 09:05 PM    CO2 26 08/19/2024 09:05 PM    BUN 12 08/19/2024 09:05 PM    CREATININE 1.0 08/19/2024 09:05 PM    GLUCOSE 97 08/19/2024 09:05 PM    GLUCOSE 131 05/24/2016 03:33 PM    CALCIUM 8.4 08/19/2024 09:05 PM    LABGLOM 55 08/19/2024 09:05 PM    LABGLOM >60 05/16/2023 08:43 AM

## 2025-03-18 RX ORDER — FAMOTIDINE 20 MG/1
TABLET, FILM COATED ORAL
Qty: 180 TABLET | Refills: 3 | Status: SHIPPED | OUTPATIENT
Start: 2025-03-18

## 2025-03-18 RX ORDER — SIMVASTATIN 40 MG
40 TABLET ORAL NIGHTLY
Qty: 90 TABLET | Refills: 3 | Status: SHIPPED | OUTPATIENT
Start: 2025-03-18

## 2025-03-18 NOTE — TELEPHONE ENCOUNTER
Requested Prescriptions     Pending Prescriptions Disp Refills    simvastatin (ZOCOR) 40 MG tablet [Pharmacy Med Name: SIMVASTATIN 40MG TAB] 90 tablet 3     Sig: Take 1 Tablet by Mouth Nightly    famotidine (PEPCID) 20 MG tablet [Pharmacy Med Name: FAMOTIDINE 20MG TAB] 180 tablet 3     Sig: Take 1 Tablet Twice Daily            Checked Correct Pharmacy: Yes  Any changes since last refill? No       Last OV: 11/22/2024 Provider: CARTER  Next OV: 5/22/2025 Provider: CARTER    Last Labs: 05/16/2023    Lipid:   Lab Results   Component Value Date    CHOL 148 05/16/2023    TRIG 56 05/16/2023    HDL 58 05/16/2023    LDL 79 05/16/2023    VLDL 11 05/16/2023       Lipid panel order placed 11/22/2024-needs to be completed.

## 2025-04-21 RX ORDER — FUROSEMIDE 20 MG/1
TABLET ORAL
Qty: 39 TABLET | Refills: 0 | Status: SHIPPED | OUTPATIENT
Start: 2025-04-21

## 2025-05-29 NOTE — PROGRESS NOTES
[facility]     Outpatient Cardiology         Patient Name:  Ousmane Walters  Primary Care Physician: Jeff Magaña MD  06/12/25     Assessment & Plan    Assessment / Plan:     Atherosclerotic heart disease, multiple coronary stents-no angina.  Check lipid panel.  Essential hypertension-well-controlled.  Check BMP.  Continue Coreg, Entresto, Aldactone  History of pulmonary embolism-has completed Eliquis therapy.  Mild LV systolic dysfunction in 2017-no symptoms of heart failure.  Follow-up in 6 months                Chief Complaint:     Chief Complaint   Patient presents with    6 Month Follow-Up     Former [employer] Pt        History of Present Illness:       HPI     Ousmane Walters is a 85 y.o. female with PMH of systolic heart failure, PE, CAD, HTN and HLD here for a follow up.       Today she reports she is doing well.   Patient denies any chest pain, shortness of breath, palpitations, presyncope or syncope. No TIA. No claudication. No recent hospitalizations    Reviewed medications, tests and labs    PMH  Past Medical History:   Diagnosis Date    Acute DVT of popliteal vein of right lower extremity 04/07/2017    Coronary artery disease 2000    Cough     Hyperlipidemia     Hypertension     Myocardial infarction involving LAD coronary artery 10/11/2000       PSH  Past Surgical History:   Procedure Laterality Date    BONE MARROW BIOPSY      BREAST BIOPSY      FINE NEEDLE ASPIRATION      HYSTERECTOMY (CERVIX STATUS UNKNOWN)      INTRACAPSULAR CATARACT EXTRACTION      JOINT REPLACEMENT      KNEE SURGERY      TUNNELED VENOUS PORT PLACEMENT          Social HIstory  Social History     Tobacco Use    Smoking status: Never    Smokeless tobacco: Never   Substance Use Topics    Alcohol use: No    Drug use: No       Family History  Family History   Problem Relation Age of Onset    Heart Disease Other        Allergies   No Known Allergies    Medications:     Home Medications:  Were reviewed and are

## 2025-05-29 NOTE — PATIENT INSTRUCTIONS
Thank you for choosing [facility] for your cardiac care.    During your visit today, we reviewed and confirmed your cardiac medications along with  medication prescribed by your other healthcare team members. Please be sure to discuss any  changes to medication with your providers.    Please bring a list of ALL medications (or the bottles) with you to EVERY appointment.  Also include vitamins and over-the-counter medications.    If you need refills for any cardiac medications, please call your pharmacy and they will reach out to us electronically.    Did your provider order testing today? If yes, then you will receive your results in three  possible ways. You can receive a [employer] message, a phone call, or letter in the mail. Please  note, if you are an active [employer] user, some of your testing will be available within 1-2 days.    Finally, please know that it is good for your heart to exercise and follow a healthy, low-fat diet  as advised by your physician and health care providers.    If you are experiencing a medical emergency, please call 911 immediately.    It's easy to register for a [employer] account if you don't already have one. With a [employer]  account you can manage your health record, view test results, schedule appointments and  more.     Dr. Poole's clinical staff can be reached at the following phone number: [phone]    If any cardiac testing is ordered, please contact central scheduling at [phone] to get your test scheduled.

## 2025-06-12 ENCOUNTER — OFFICE VISIT (OUTPATIENT)
Dept: CARDIOLOGY CLINIC | Age: 85
End: 2025-06-12
Payer: MEDICARE

## 2025-06-12 VITALS
BODY MASS INDEX: 23.57 KG/M2 | HEART RATE: 60 BPM | WEIGHT: 146 LBS | SYSTOLIC BLOOD PRESSURE: 126 MMHG | DIASTOLIC BLOOD PRESSURE: 60 MMHG

## 2025-06-12 DIAGNOSIS — E78.2 MIXED HYPERLIPIDEMIA: ICD-10-CM

## 2025-06-12 DIAGNOSIS — I50.22 CHRONIC SYSTOLIC CONGESTIVE HEART FAILURE (HCC): Primary | ICD-10-CM

## 2025-06-12 DIAGNOSIS — I50.22 CHRONIC SYSTOLIC CONGESTIVE HEART FAILURE (HCC): ICD-10-CM

## 2025-06-12 PROCEDURE — 1036F TOBACCO NON-USER: CPT | Performed by: INTERNAL MEDICINE

## 2025-06-12 PROCEDURE — 1123F ACP DISCUSS/DSCN MKR DOCD: CPT | Performed by: INTERNAL MEDICINE

## 2025-06-12 PROCEDURE — 1159F MED LIST DOCD IN RCRD: CPT | Performed by: INTERNAL MEDICINE

## 2025-06-12 PROCEDURE — 93000 ELECTROCARDIOGRAM COMPLETE: CPT | Performed by: INTERNAL MEDICINE

## 2025-06-12 PROCEDURE — 1160F RVW MEDS BY RX/DR IN RCRD: CPT | Performed by: INTERNAL MEDICINE

## 2025-06-12 PROCEDURE — 1090F PRES/ABSN URINE INCON ASSESS: CPT | Performed by: INTERNAL MEDICINE

## 2025-06-12 PROCEDURE — G8427 DOCREV CUR MEDS BY ELIG CLIN: HCPCS | Performed by: INTERNAL MEDICINE

## 2025-06-12 PROCEDURE — 99214 OFFICE O/P EST MOD 30 MIN: CPT | Performed by: INTERNAL MEDICINE

## 2025-06-12 PROCEDURE — G8420 CALC BMI NORM PARAMETERS: HCPCS | Performed by: INTERNAL MEDICINE

## 2025-06-13 ENCOUNTER — RESULTS FOLLOW-UP (OUTPATIENT)
Dept: CARDIOLOGY | Age: 85
End: 2025-06-13

## 2025-06-13 LAB
ANION GAP SERPL CALCULATED.3IONS-SCNC: 7 MMOL/L (ref 3–16)
BUN SERPL-MCNC: 24 MG/DL (ref 7–20)
CALCIUM SERPL-MCNC: 9.2 MG/DL (ref 8.3–10.6)
CHLORIDE SERPL-SCNC: 102 MMOL/L (ref 99–110)
CHOLEST SERPL-MCNC: 168 MG/DL (ref 0–199)
CO2 SERPL-SCNC: 29 MMOL/L (ref 21–32)
CREAT SERPL-MCNC: 1 MG/DL (ref 0.6–1.2)
DEPRECATED RDW RBC AUTO: 13.7 % (ref 12.4–15.4)
GFR SERPLBLD CREATININE-BSD FMLA CKD-EPI: 55 ML/MIN/{1.73_M2}
GLUCOSE SERPL-MCNC: 84 MG/DL (ref 70–99)
HCT VFR BLD AUTO: 35.7 % (ref 36–48)
HDLC SERPL-MCNC: 64 MG/DL (ref 40–60)
HGB BLD-MCNC: 12.3 G/DL (ref 12–16)
LDLC SERPL CALC-MCNC: 90 MG/DL
MCH RBC QN AUTO: 33.9 PG (ref 26–34)
MCHC RBC AUTO-ENTMCNC: 34.4 G/DL (ref 31–36)
MCV RBC AUTO: 98.6 FL (ref 80–100)
PLATELET # BLD AUTO: 166 K/UL (ref 135–450)
PMV BLD AUTO: 10 FL (ref 5–10.5)
POTASSIUM SERPL-SCNC: 4.6 MMOL/L (ref 3.5–5.1)
RBC # BLD AUTO: 3.62 M/UL (ref 4–5.2)
SODIUM SERPL-SCNC: 138 MMOL/L (ref 136–145)
TRIGL SERPL-MCNC: 72 MG/DL (ref 0–150)
VLDLC SERPL CALC-MCNC: 14 MG/DL
WBC # BLD AUTO: 3.7 K/UL (ref 4–11)

## 2025-06-13 RX ORDER — ROSUVASTATIN CALCIUM 40 MG/1
40 TABLET, COATED ORAL DAILY
Qty: 90 TABLET | Refills: 3 | Status: SHIPPED | OUTPATIENT
Start: 2025-06-13

## 2025-06-30 RX ORDER — FUROSEMIDE 20 MG/1
TABLET ORAL
Qty: 36 TABLET | Refills: 3 | Status: SHIPPED | OUTPATIENT
Start: 2025-06-30